# Patient Record
Sex: FEMALE | Race: WHITE | NOT HISPANIC OR LATINO | Employment: OTHER | URBAN - METROPOLITAN AREA
[De-identification: names, ages, dates, MRNs, and addresses within clinical notes are randomized per-mention and may not be internally consistent; named-entity substitution may affect disease eponyms.]

---

## 2018-05-25 ENCOUNTER — HOSPITAL ENCOUNTER (OUTPATIENT)
Dept: WOUND CARE | Facility: CLINIC | Age: 78
End: 2018-05-25
Attending: COLON & RECTAL SURGERY
Payer: MEDICARE

## 2018-05-25 PROCEDURE — 40000901 ZZH STATISTIC WOC PT EDUCATION, 0-15 MIN

## 2018-05-25 NOTE — PROGRESS NOTES
"Focus:  Existing ileostomy with parastomal hernia  S:  Pt to clinic to discuss management issues that took place while pt wintered in [place]. Reported 3 x episodes of / blockages from hernia vs adhesions. Hospitalized x 2.  Blockage resolved without sugery with conservative measures. Current system is Imelda NI convex with ring and opaque pouch.  Pt currently in 2 1/4\" convex flange. Only c/o leakage with showering  O:  No stoma assessment performed today as pt just changed appliance. Discussed change dimensions of hernia belt; tips to keep pouching system dry with showering including Pink Tape and Aqua Dry sheetsl    Previous Betls  I: Trial of Nu-Hope NU-Form  hernia belt:        #6422      5\" belt      Cool comfort      X-large      2 3/8\" standard central opening    2. Pt will touch base next week having used the belt for a few days. May need the following       NuForm Nu-Hope hernia belt       4\" belt       2 3/4\" center opening       X-large       Cool comfort       #6413-A    Belt order dated 8-29-19  NuForm [employer] Hernia Belt  5\"  Large  Cool comfort  2 3/4\" center opening  #[id number]      Supplier [facility] stat only  "

## 2019-08-27 NOTE — ADDENDUM NOTE
Encounter addended by: Shelly Cervantes RN on: 8/27/2019 5:15 PM   Actions taken: Sign clinical note

## 2019-09-19 ENCOUNTER — TRANSFERRED RECORDS (OUTPATIENT)
Dept: SURGERY | Facility: CLINIC | Age: 79
End: 2019-09-19

## 2019-10-14 ENCOUNTER — OFFICE VISIT (OUTPATIENT)
Dept: SURGERY | Facility: CLINIC | Age: 79
End: 2019-10-14
Payer: MEDICARE

## 2019-10-14 VITALS
WEIGHT: 142.7 LBS | BODY MASS INDEX: 28.02 KG/M2 | DIASTOLIC BLOOD PRESSURE: 70 MMHG | SYSTOLIC BLOOD PRESSURE: 133 MMHG | HEART RATE: 79 BPM | HEIGHT: 60 IN

## 2019-10-14 DIAGNOSIS — K43.5 PARASTOMAL HERNIA WITHOUT OBSTRUCTION OR GANGRENE: Primary | ICD-10-CM

## 2019-10-14 PROCEDURE — 99214 OFFICE O/P EST MOD 30 MIN: CPT | Performed by: SURGERY

## 2019-10-14 ASSESSMENT — MIFFLIN-ST. JEOR: SCORE: 1040.84

## 2019-10-14 NOTE — LETTER
2019    RE: Merlene River, : 1940      HPI:  I was asked by Dr. Chelsey Waterman to evaluate patient regarding a recurrent parastomal hernia.  Merlene is a 79 year old female who underwent a subtotal colectomy and permanent ileostomy in  for Crohn's disease.  In , the patient had a bowel obstruction related to a parastomal hernia.  She had a bowel resection and hernia repair at that time with an onlay of biologic mesh in a keyhole fashion.  The patient had fairly rapid recurrence following that.  She continues to have abdominal symptoms which are somewhat diffuse.  Her hernia has gotten larger.  Her last episode of bowel obstruction was in .  She does feel a great deal of gurgling in the hernia.  She denies any recent nausea or vomiting.  She denies fever or chills.     Past Medical History:   Has a past medical history of Crohn's colitis (H).     Past Surgical History:     Family History:  Patient denies any significant family history.     ROS:  The 10 point review of systems is negative other than noted in the HPI and above.     PE:    General- Well-developed, well-nourished, patient able to get up on table without difficulty.  HEENT- Normocephalic and atraumatic.  Pupils equal and round.  Mucous membranes moist.  Sclera are nonicteric.  Neck- No lymphadenopathy or masses   Respirations- are regular and non labored  Abdomen is abdomen is soft without significant tenderness, masses, organomegaly or guarding  There is a prominent right sided parastomal hernia.  This is only partially reducible.  Skin shows no evidence of jaundice.  Neurologic-nonfocal exam.  Psychiatric-the patient shows good insight into her situation.  She is particularly well educated about her issues.               Assessment: Prominent right parastomal hernia.     Plan: We discussed the options of observation versus reciting of the stoma versus robotic assisted repair of the patient's recurrent parastomal hernia.  I  explained that this is a complex type of hernia, and success with repair can be limited.  We had a discussion of the risks and potential complications of surgery.  If the patient decided to pursue reciting of the hernia, that would be up to the colorectal surgeons.  We have discussed observation, reduction techniques and importance, incarceration and strangulation signs, symptoms and importance as well as need to seek emergency treatment.    At this point, the patient is scheduled to go to [place] very shortly for the winter.  She will consider her options and let me know if she would like to proceed with any surgical intervention.           Ben Pagan MD

## 2019-10-14 NOTE — PROGRESS NOTES
HPI:  I was asked by Dr. Chelsey Watemran to evaluate patient regarding a recurrent parastomal hernia.  Merlene is a 79 year old female who underwent a subtotal colectomy and permanent ileostomy in 2009 for Crohn's disease.  In 2012, the patient had a bowel obstruction related to a parastomal hernia.  She had a bowel resection and hernia repair at that time with an onlay of biologic mesh in a keyhole fashion.  The patient had fairly rapid recurrence following that.  She continues to have abdominal symptoms which are somewhat diffuse.  Her hernia has gotten larger.  Her last episode of bowel obstruction was in 2015.  She does feel a great deal of gurgling in the hernia.  She denies any recent nausea or vomiting.  She denies fever or chills.    Past Medical History:   has a past medical history of Crohn's colitis (H).    Past Surgical History:  Past Surgical History:   Procedure Laterality Date     colon resesection       GI SURGERY      Ostomy      Social History:  Social History     Socioeconomic History     Marital status:      Spouse name: Not on file     Number of children: Not on file     Years of education: Not on file     Highest education level: Not on file   Occupational History     Not on file   Social Needs     Financial resource strain: Not on file     Food insecurity:     Worry: Not on file     Inability: Not on file     Transportation needs:     Medical: Not on file     Non-medical: Not on file   Tobacco Use     Smoking status: Former Smoker     Packs/day: 0.00     Smokeless tobacco: Never Used   Substance and Sexual Activity     Alcohol use: Yes     Comment: rarely     Drug use: No     Sexual activity: Not on file   Lifestyle     Physical activity:     Days per week: Not on file     Minutes per session: Not on file     Stress: Not on file   Relationships     Social connections:     Talks on phone: Not on file     Gets together: Not on file     Attends Hinduism service: Not on file     Active member  of club or organization: Not on file     Attends meetings of clubs or organizations: Not on file     Relationship status: Not on file     Intimate partner violence:     Fear of current or ex partner: Not on file     Emotionally abused: Not on file     Physically abused: Not on file     Forced sexual activity: Not on file   Other Topics Concern     Not on file   Social History Narrative     Not on file        Family History:  Patient denies any significant family history.      ROS:  The 10 point review of systems is negative other than noted in the HPI and above.    PE:    General- Well-developed, well-nourished, patient able to get up on table without difficulty.  HEENT- Normocephalic and atraumatic.  Pupils equal and round.  Mucous membranes moist.  Sclera are nonicteric.  Neck- No lymphadenopathy or masses   Respirations- are regular and non labored  Abdomen is abdomen is soft without significant tenderness, masses, organomegaly or guarding  There is a prominent right sided parastomal hernia.  This is only partially reducible.  Skin shows no evidence of jaundice.  Neurologic-nonfocal exam.  Psychiatric-the patient shows good insight into her situation.  She is particularly well educated about her issues.               Assessment: Prominent right parastomal hernia.    Plan: We discussed the options of observation versus reciting of the stoma versus robotic assisted repair of the patient's recurrent parastomal hernia.  I explained that this is a complex type of hernia, and success with repair can be limited.  We had a discussion of the risks and potential complications of surgery.  If the patient decided to pursue reciting of the hernia, that would be up to the colorectal surgeons.  We have discussed observation, reduction techniques and importance, incarceration and strangulation signs, symptoms and importance as well as need to seek emergency treatment.    At this point, the patient is scheduled to go to [place]  very shortly for the winter.  She will consider her options and let me know if she would like to proceed with any surgical intervention.        Ben Pagan MD    Please route or send letter to:  Referring Provider

## 2022-08-22 ENCOUNTER — ANESTHESIA EVENT (OUTPATIENT)
Dept: SURGERY | Facility: CLINIC | Age: 82
DRG: 336 | End: 2022-08-22
Payer: MEDICARE

## 2022-08-22 ENCOUNTER — ANESTHESIA (OUTPATIENT)
Dept: SURGERY | Facility: CLINIC | Age: 82
DRG: 336 | End: 2022-08-22
Payer: MEDICARE

## 2022-08-22 ENCOUNTER — HOSPITAL ENCOUNTER (INPATIENT)
Facility: CLINIC | Age: 82
LOS: 5 days | Discharge: HOME | DRG: 336 | End: 2022-08-27
Attending: EMERGENCY MEDICINE | Admitting: INTERNAL MEDICINE
Payer: MEDICARE

## 2022-08-22 ENCOUNTER — APPOINTMENT (OUTPATIENT)
Dept: CT IMAGING | Facility: CLINIC | Age: 82
DRG: 336 | End: 2022-08-22
Attending: EMERGENCY MEDICINE
Payer: MEDICARE

## 2022-08-22 DIAGNOSIS — K45.8 INTERNAL HERNIA: ICD-10-CM

## 2022-08-22 DIAGNOSIS — K56.601 COMPLETE INTESTINAL OBSTRUCTION, UNSPECIFIED CAUSE (H): ICD-10-CM

## 2022-08-22 DIAGNOSIS — K56.609 SBO (SMALL BOWEL OBSTRUCTION) (H): Primary | ICD-10-CM

## 2022-08-22 LAB
ABO/RH(D): NORMAL
ALBUMIN SERPL-MCNC: 3.4 G/DL (ref 3.4–5)
ALP SERPL-CCNC: 92 U/L (ref 40–150)
ALT SERPL W P-5'-P-CCNC: 28 U/L (ref 0–50)
ANION GAP SERPL CALCULATED.3IONS-SCNC: 7 MMOL/L (ref 3–14)
ANTIBODY SCREEN: NEGATIVE
APTT PPP: 25 SECONDS (ref 22–38)
AST SERPL W P-5'-P-CCNC: 20 U/L (ref 0–45)
BASOPHILS # BLD AUTO: 0.1 10E3/UL (ref 0–0.2)
BASOPHILS NFR BLD AUTO: 0 %
BILIRUB SERPL-MCNC: 0.6 MG/DL (ref 0.2–1.3)
BUN SERPL-MCNC: 22 MG/DL (ref 7–30)
CALCIUM SERPL-MCNC: 8.8 MG/DL (ref 8.5–10.1)
CHLORIDE BLD-SCNC: 110 MMOL/L (ref 94–109)
CO2 SERPL-SCNC: 23 MMOL/L (ref 20–32)
CREAT SERPL-MCNC: 1.01 MG/DL (ref 0.52–1.04)
EOSINOPHIL # BLD AUTO: 0 10E3/UL (ref 0–0.7)
EOSINOPHIL NFR BLD AUTO: 0 %
ERYTHROCYTE [DISTWIDTH] IN BLOOD BY AUTOMATED COUNT: 13.2 % (ref 10–15)
GFR SERPL CREATININE-BSD FRML MDRD: 56 ML/MIN/1.73M2
GLUCOSE BLD-MCNC: 204 MG/DL (ref 70–99)
HCT VFR BLD AUTO: 40.9 % (ref 35–47)
HGB BLD-MCNC: 13.8 G/DL (ref 11.7–15.7)
IMM GRANULOCYTES # BLD: 0.1 10E3/UL
IMM GRANULOCYTES NFR BLD: 1 %
INR PPP: 1 (ref 0.85–1.15)
LIPASE SERPL-CCNC: 113 U/L (ref 73–393)
LYMPHOCYTES # BLD AUTO: 0.8 10E3/UL (ref 0.8–5.3)
LYMPHOCYTES NFR BLD AUTO: 4 %
MCH RBC QN AUTO: 29.2 PG (ref 26.5–33)
MCHC RBC AUTO-ENTMCNC: 33.7 G/DL (ref 31.5–36.5)
MCV RBC AUTO: 87 FL (ref 78–100)
MONOCYTES # BLD AUTO: 0.9 10E3/UL (ref 0–1.3)
MONOCYTES NFR BLD AUTO: 4 %
NEUTROPHILS # BLD AUTO: 18.9 10E3/UL (ref 1.6–8.3)
NEUTROPHILS NFR BLD AUTO: 91 %
NRBC # BLD AUTO: 0 10E3/UL
NRBC BLD AUTO-RTO: 0 /100
PLATELET # BLD AUTO: 243 10E3/UL (ref 150–450)
POTASSIUM BLD-SCNC: 3.3 MMOL/L (ref 3.4–5.3)
PROT SERPL-MCNC: 7 G/DL (ref 6.8–8.8)
RADIOLOGIST FLAGS: ABNORMAL
RBC # BLD AUTO: 4.72 10E6/UL (ref 3.8–5.2)
SARS-COV-2 RNA RESP QL NAA+PROBE: NEGATIVE
SODIUM SERPL-SCNC: 140 MMOL/L (ref 133–144)
SPECIMEN EXPIRATION DATE: NORMAL
WBC # BLD AUTO: 20.8 10E3/UL (ref 4–11)

## 2022-08-22 PROCEDURE — 85730 THROMBOPLASTIN TIME PARTIAL: CPT | Performed by: EMERGENCY MEDICINE

## 2022-08-22 PROCEDURE — 99223 1ST HOSP IP/OBS HIGH 75: CPT | Mod: AI | Performed by: INTERNAL MEDICINE

## 2022-08-22 PROCEDURE — U0003 INFECTIOUS AGENT DETECTION BY NUCLEIC ACID (DNA OR RNA); SEVERE ACUTE RESPIRATORY SYNDROME CORONAVIRUS 2 (SARS-COV-2) (CORONAVIRUS DISEASE [COVID-19]), AMPLIFIED PROBE TECHNIQUE, MAKING USE OF HIGH THROUGHPUT TECHNOLOGIES AS DESCRIBED BY CMS-2020-01-R: HCPCS | Performed by: EMERGENCY MEDICINE

## 2022-08-22 PROCEDURE — 85025 COMPLETE CBC W/AUTO DIFF WBC: CPT | Performed by: EMERGENCY MEDICINE

## 2022-08-22 PROCEDURE — 99285 EMERGENCY DEPT VISIT HI MDM: CPT | Mod: 25

## 2022-08-22 PROCEDURE — 36415 COLL VENOUS BLD VENIPUNCTURE: CPT | Performed by: EMERGENCY MEDICINE

## 2022-08-22 PROCEDURE — 85610 PROTHROMBIN TIME: CPT | Performed by: EMERGENCY MEDICINE

## 2022-08-22 PROCEDURE — C9803 HOPD COVID-19 SPEC COLLECT: HCPCS

## 2022-08-22 PROCEDURE — G1010 CDSM STANSON: HCPCS

## 2022-08-22 PROCEDURE — 250N000011 HC RX IP 250 OP 636: Performed by: EMERGENCY MEDICINE

## 2022-08-22 PROCEDURE — 82040 ASSAY OF SERUM ALBUMIN: CPT | Performed by: EMERGENCY MEDICINE

## 2022-08-22 PROCEDURE — 120N000001 HC R&B MED SURG/OB

## 2022-08-22 PROCEDURE — 96374 THER/PROPH/DIAG INJ IV PUSH: CPT

## 2022-08-22 PROCEDURE — 86850 RBC ANTIBODY SCREEN: CPT | Performed by: EMERGENCY MEDICINE

## 2022-08-22 PROCEDURE — 250N000011 HC RX IP 250 OP 636: Performed by: COLON & RECTAL SURGERY

## 2022-08-22 PROCEDURE — 80053 COMPREHEN METABOLIC PANEL: CPT | Performed by: EMERGENCY MEDICINE

## 2022-08-22 PROCEDURE — 96375 TX/PRO/DX INJ NEW DRUG ADDON: CPT

## 2022-08-22 PROCEDURE — 83690 ASSAY OF LIPASE: CPT | Performed by: EMERGENCY MEDICINE

## 2022-08-22 RX ORDER — OXYCODONE HYDROCHLORIDE 5 MG/1
5 TABLET ORAL EVERY 4 HOURS PRN
Status: DISCONTINUED | OUTPATIENT
Start: 2022-08-22 | End: 2022-08-23 | Stop reason: HOSPADM

## 2022-08-22 RX ORDER — CEFAZOLIN SODIUM/WATER 2 G/20 ML
2 SYRINGE (ML) INTRAVENOUS
Status: COMPLETED | OUTPATIENT
Start: 2022-08-22 | End: 2022-08-23

## 2022-08-22 RX ORDER — HYDROMORPHONE HCL IN WATER/PF 6 MG/30 ML
0.4 PATIENT CONTROLLED ANALGESIA SYRINGE INTRAVENOUS EVERY 5 MIN PRN
Status: DISCONTINUED | OUTPATIENT
Start: 2022-08-22 | End: 2022-08-23 | Stop reason: HOSPADM

## 2022-08-22 RX ORDER — METRONIDAZOLE 500 MG/100ML
500 INJECTION, SOLUTION INTRAVENOUS
Status: COMPLETED | OUTPATIENT
Start: 2022-08-22 | End: 2022-08-23

## 2022-08-22 RX ORDER — CEFAZOLIN SODIUM/WATER 2 G/20 ML
2 SYRINGE (ML) INTRAVENOUS SEE ADMIN INSTRUCTIONS
Status: DISCONTINUED | OUTPATIENT
Start: 2022-08-22 | End: 2022-08-23

## 2022-08-22 RX ORDER — ONDANSETRON 2 MG/ML
4 INJECTION INTRAMUSCULAR; INTRAVENOUS EVERY 30 MIN PRN
Status: DISCONTINUED | OUTPATIENT
Start: 2022-08-22 | End: 2022-08-23 | Stop reason: HOSPADM

## 2022-08-22 RX ORDER — FENTANYL CITRATE 0.05 MG/ML
25 INJECTION, SOLUTION INTRAMUSCULAR; INTRAVENOUS EVERY 5 MIN PRN
Status: DISCONTINUED | OUTPATIENT
Start: 2022-08-22 | End: 2022-08-23 | Stop reason: HOSPADM

## 2022-08-22 RX ORDER — ONDANSETRON 2 MG/ML
4 INJECTION INTRAMUSCULAR; INTRAVENOUS ONCE
Status: COMPLETED | OUTPATIENT
Start: 2022-08-22 | End: 2022-08-22

## 2022-08-22 RX ORDER — ONDANSETRON 4 MG/1
4 TABLET, ORALLY DISINTEGRATING ORAL EVERY 30 MIN PRN
Status: DISCONTINUED | OUTPATIENT
Start: 2022-08-22 | End: 2022-08-23 | Stop reason: HOSPADM

## 2022-08-22 RX ORDER — SODIUM CHLORIDE, SODIUM LACTATE, POTASSIUM CHLORIDE, CALCIUM CHLORIDE 600; 310; 30; 20 MG/100ML; MG/100ML; MG/100ML; MG/100ML
INJECTION, SOLUTION INTRAVENOUS CONTINUOUS
Status: DISCONTINUED | OUTPATIENT
Start: 2022-08-23 | End: 2022-08-23 | Stop reason: HOSPADM

## 2022-08-22 RX ORDER — HYDROMORPHONE HYDROCHLORIDE 1 MG/ML
0.5 INJECTION, SOLUTION INTRAMUSCULAR; INTRAVENOUS; SUBCUTANEOUS
Status: DISCONTINUED | OUTPATIENT
Start: 2022-08-22 | End: 2022-08-23

## 2022-08-22 RX ADMIN — ONDANSETRON 4 MG: 2 INJECTION INTRAMUSCULAR; INTRAVENOUS at 20:06

## 2022-08-22 RX ADMIN — HYDROMORPHONE HYDROCHLORIDE 0.5 MG: 1 INJECTION, SOLUTION INTRAMUSCULAR; INTRAVENOUS; SUBCUTANEOUS at 20:08

## 2022-08-22 RX ADMIN — METRONIDAZOLE 500 MG: 500 INJECTION, SOLUTION INTRAVENOUS at 23:48

## 2022-08-22 ASSESSMENT — ENCOUNTER SYMPTOMS: ABDOMINAL PAIN: 1

## 2022-08-22 ASSESSMENT — ACTIVITIES OF DAILY LIVING (ADL)
ADLS_ACUITY_SCORE: 35
ADLS_ACUITY_SCORE: 35

## 2022-08-22 ASSESSMENT — LIFESTYLE VARIABLES: TOBACCO_USE: 1

## 2022-08-23 ENCOUNTER — APPOINTMENT (OUTPATIENT)
Dept: GENERAL RADIOLOGY | Facility: CLINIC | Age: 82
DRG: 336 | End: 2022-08-23
Attending: INTERNAL MEDICINE
Payer: MEDICARE

## 2022-08-23 LAB
ALBUMIN SERPL-MCNC: 2.7 G/DL (ref 3.4–5)
ALP SERPL-CCNC: 68 U/L (ref 40–150)
ALT SERPL W P-5'-P-CCNC: 23 U/L (ref 0–50)
ANION GAP SERPL CALCULATED.3IONS-SCNC: 6 MMOL/L (ref 3–14)
AST SERPL W P-5'-P-CCNC: 20 U/L (ref 0–45)
BILIRUB SERPL-MCNC: 0.7 MG/DL (ref 0.2–1.3)
BUN SERPL-MCNC: 22 MG/DL (ref 7–30)
CALCIUM SERPL-MCNC: 8.3 MG/DL (ref 8.5–10.1)
CHLORIDE BLD-SCNC: 112 MMOL/L (ref 94–109)
CO2 SERPL-SCNC: 24 MMOL/L (ref 20–32)
CREAT SERPL-MCNC: 0.83 MG/DL (ref 0.52–1.04)
ERYTHROCYTE [DISTWIDTH] IN BLOOD BY AUTOMATED COUNT: 13.2 % (ref 10–15)
GFR SERPL CREATININE-BSD FRML MDRD: 71 ML/MIN/1.73M2
GLUCOSE BLD-MCNC: 153 MG/DL (ref 70–99)
GLUCOSE BLDC GLUCOMTR-MCNC: 115 MG/DL (ref 70–99)
GLUCOSE BLDC GLUCOMTR-MCNC: 124 MG/DL (ref 70–99)
GLUCOSE BLDC GLUCOMTR-MCNC: 136 MG/DL (ref 70–99)
GLUCOSE BLDC GLUCOMTR-MCNC: 161 MG/DL (ref 70–99)
HBA1C MFR BLD: 5.3 % (ref 0–5.6)
HCT VFR BLD AUTO: 37.9 % (ref 35–47)
HGB BLD-MCNC: 13.1 G/DL (ref 11.7–15.7)
MAGNESIUM SERPL-MCNC: 1.5 MG/DL (ref 1.6–2.3)
MAGNESIUM SERPL-MCNC: 2.8 MG/DL (ref 1.6–2.3)
MCH RBC QN AUTO: 29.2 PG (ref 26.5–33)
MCHC RBC AUTO-ENTMCNC: 34.6 G/DL (ref 31.5–36.5)
MCV RBC AUTO: 85 FL (ref 78–100)
PHOSPHATE SERPL-MCNC: 3.3 MG/DL (ref 2.5–4.5)
PLATELET # BLD AUTO: 241 10E3/UL (ref 150–450)
POTASSIUM BLD-SCNC: 3.7 MMOL/L (ref 3.4–5.3)
POTASSIUM BLD-SCNC: 3.8 MMOL/L (ref 3.4–5.3)
PROT SERPL-MCNC: 5.6 G/DL (ref 6.8–8.8)
RBC # BLD AUTO: 4.48 10E6/UL (ref 3.8–5.2)
SODIUM SERPL-SCNC: 142 MMOL/L (ref 133–144)
WBC # BLD AUTO: 19.1 10E3/UL (ref 4–11)

## 2022-08-23 PROCEDURE — 85027 COMPLETE CBC AUTOMATED: CPT | Performed by: NURSE PRACTITIONER

## 2022-08-23 PROCEDURE — 84132 ASSAY OF SERUM POTASSIUM: CPT | Performed by: NURSE PRACTITIONER

## 2022-08-23 PROCEDURE — 120N000001 HC R&B MED SURG/OB

## 2022-08-23 PROCEDURE — 258N000003 HC RX IP 258 OP 636: Performed by: NURSE PRACTITIONER

## 2022-08-23 PROCEDURE — 250N000013 HC RX MED GY IP 250 OP 250 PS 637: Performed by: NURSE PRACTITIONER

## 2022-08-23 PROCEDURE — 84100 ASSAY OF PHOSPHORUS: CPT | Performed by: NURSE PRACTITIONER

## 2022-08-23 PROCEDURE — 250N000011 HC RX IP 250 OP 636: Performed by: NURSE ANESTHETIST, CERTIFIED REGISTERED

## 2022-08-23 PROCEDURE — 272N000001 HC OR GENERAL SUPPLY STERILE: Performed by: COLON & RECTAL SURGERY

## 2022-08-23 PROCEDURE — 82947 ASSAY GLUCOSE BLOOD QUANT: CPT | Performed by: INTERNAL MEDICINE

## 2022-08-23 PROCEDURE — 83036 HEMOGLOBIN GLYCOSYLATED A1C: CPT | Performed by: INTERNAL MEDICINE

## 2022-08-23 PROCEDURE — 250N000011 HC RX IP 250 OP 636: Performed by: NURSE PRACTITIONER

## 2022-08-23 PROCEDURE — 36415 COLL VENOUS BLD VENIPUNCTURE: CPT | Performed by: NURSE PRACTITIONER

## 2022-08-23 PROCEDURE — 250N000009 HC RX 250: Performed by: NURSE ANESTHETIST, CERTIFIED REGISTERED

## 2022-08-23 PROCEDURE — 250N000011 HC RX IP 250 OP 636: Performed by: PHYSICIAN ASSISTANT

## 2022-08-23 PROCEDURE — 0DN80ZZ RELEASE SMALL INTESTINE, OPEN APPROACH: ICD-10-PCS | Performed by: COLON & RECTAL SURGERY

## 2022-08-23 PROCEDURE — 258N000003 HC RX IP 258 OP 636: Performed by: INTERNAL MEDICINE

## 2022-08-23 PROCEDURE — 250N000011 HC RX IP 250 OP 636: Performed by: COLON & RECTAL SURGERY

## 2022-08-23 PROCEDURE — 999N000141 HC STATISTIC PRE-PROCEDURE NURSING ASSESSMENT: Performed by: COLON & RECTAL SURGERY

## 2022-08-23 PROCEDURE — 250N000011 HC RX IP 250 OP 636: Performed by: EMERGENCY MEDICINE

## 2022-08-23 PROCEDURE — 360N000076 HC SURGERY LEVEL 3, PER MIN: Performed by: COLON & RECTAL SURGERY

## 2022-08-23 PROCEDURE — 258N000003 HC RX IP 258 OP 636: Performed by: NURSE ANESTHETIST, CERTIFIED REGISTERED

## 2022-08-23 PROCEDURE — 71046 X-RAY EXAM CHEST 2 VIEWS: CPT

## 2022-08-23 PROCEDURE — 0DQV0ZZ REPAIR MESENTERY, OPEN APPROACH: ICD-10-PCS | Performed by: COLON & RECTAL SURGERY

## 2022-08-23 PROCEDURE — 258N000003 HC RX IP 258 OP 636: Performed by: SURGERY

## 2022-08-23 PROCEDURE — 370N000017 HC ANESTHESIA TECHNICAL FEE, PER MIN: Performed by: COLON & RECTAL SURGERY

## 2022-08-23 PROCEDURE — 710N000010 HC RECOVERY PHASE 1, LEVEL 2, PER MIN: Performed by: COLON & RECTAL SURGERY

## 2022-08-23 PROCEDURE — 250N000013 HC RX MED GY IP 250 OP 250 PS 637: Performed by: COLON & RECTAL SURGERY

## 2022-08-23 PROCEDURE — 250N000025 HC SEVOFLURANE, PER MIN: Performed by: COLON & RECTAL SURGERY

## 2022-08-23 PROCEDURE — 82374 ASSAY BLOOD CARBON DIOXIDE: CPT | Performed by: INTERNAL MEDICINE

## 2022-08-23 PROCEDURE — 250N000009 HC RX 250: Performed by: COLON & RECTAL SURGERY

## 2022-08-23 PROCEDURE — 83735 ASSAY OF MAGNESIUM: CPT | Performed by: NURSE PRACTITIONER

## 2022-08-23 PROCEDURE — 99232 SBSQ HOSP IP/OBS MODERATE 35: CPT | Performed by: NURSE PRACTITIONER

## 2022-08-23 PROCEDURE — 88302 TISSUE EXAM BY PATHOLOGIST: CPT | Mod: TC | Performed by: COLON & RECTAL SURGERY

## 2022-08-23 RX ORDER — NALOXONE HYDROCHLORIDE 0.4 MG/ML
0.4 INJECTION, SOLUTION INTRAMUSCULAR; INTRAVENOUS; SUBCUTANEOUS
Status: DISCONTINUED | OUTPATIENT
Start: 2022-08-23 | End: 2022-08-27 | Stop reason: HOSPADM

## 2022-08-23 RX ORDER — SODIUM CHLORIDE, SODIUM LACTATE, POTASSIUM CHLORIDE, CALCIUM CHLORIDE 600; 310; 30; 20 MG/100ML; MG/100ML; MG/100ML; MG/100ML
INJECTION, SOLUTION INTRAVENOUS CONTINUOUS
Status: DISCONTINUED | OUTPATIENT
Start: 2022-08-23 | End: 2022-08-25

## 2022-08-23 RX ORDER — CEFAZOLIN SODIUM 1 G/3ML
1 INJECTION, POWDER, FOR SOLUTION INTRAMUSCULAR; INTRAVENOUS EVERY 8 HOURS
Status: COMPLETED | OUTPATIENT
Start: 2022-08-23 | End: 2022-08-24

## 2022-08-23 RX ORDER — NICOTINE POLACRILEX 4 MG
15-30 LOZENGE BUCCAL
Status: DISCONTINUED | OUTPATIENT
Start: 2022-08-23 | End: 2022-08-27 | Stop reason: HOSPADM

## 2022-08-23 RX ORDER — PROPOFOL 10 MG/ML
INJECTION, EMULSION INTRAVENOUS CONTINUOUS PRN
Status: DISCONTINUED | OUTPATIENT
Start: 2022-08-23 | End: 2022-08-23

## 2022-08-23 RX ORDER — HYDROMORPHONE HCL IN WATER/PF 6 MG/30 ML
.2-.4 PATIENT CONTROLLED ANALGESIA SYRINGE INTRAVENOUS
Status: DISCONTINUED | OUTPATIENT
Start: 2022-08-23 | End: 2022-08-27 | Stop reason: HOSPADM

## 2022-08-23 RX ORDER — HEPARIN SODIUM 5000 [USP'U]/.5ML
5000 INJECTION, SOLUTION INTRAVENOUS; SUBCUTANEOUS EVERY 8 HOURS
Status: DISCONTINUED | OUTPATIENT
Start: 2022-08-23 | End: 2022-08-27 | Stop reason: HOSPADM

## 2022-08-23 RX ORDER — DIPHENHYDRAMINE HYDROCHLORIDE 50 MG/ML
12.5 INJECTION INTRAMUSCULAR; INTRAVENOUS EVERY 6 HOURS PRN
Status: DISCONTINUED | OUTPATIENT
Start: 2022-08-23 | End: 2022-08-23

## 2022-08-23 RX ORDER — ACETAMINOPHEN 325 MG/1
650 TABLET ORAL EVERY 6 HOURS PRN
Status: DISCONTINUED | OUTPATIENT
Start: 2022-08-23 | End: 2022-08-27 | Stop reason: HOSPADM

## 2022-08-23 RX ORDER — LIDOCAINE 40 MG/G
CREAM TOPICAL
Status: DISCONTINUED | OUTPATIENT
Start: 2022-08-23 | End: 2022-08-23

## 2022-08-23 RX ORDER — ONDANSETRON 2 MG/ML
4 INJECTION INTRAMUSCULAR; INTRAVENOUS EVERY 6 HOURS PRN
Status: DISCONTINUED | OUTPATIENT
Start: 2022-08-23 | End: 2022-08-23

## 2022-08-23 RX ORDER — ONDANSETRON 4 MG/1
4 TABLET, ORALLY DISINTEGRATING ORAL EVERY 6 HOURS PRN
Status: DISCONTINUED | OUTPATIENT
Start: 2022-08-23 | End: 2022-08-27 | Stop reason: HOSPADM

## 2022-08-23 RX ORDER — PROCHLORPERAZINE MALEATE 5 MG
5 TABLET ORAL EVERY 6 HOURS PRN
Status: DISCONTINUED | OUTPATIENT
Start: 2022-08-23 | End: 2022-08-27 | Stop reason: HOSPADM

## 2022-08-23 RX ORDER — METRONIDAZOLE 500 MG/100ML
500 INJECTION, SOLUTION INTRAVENOUS EVERY 12 HOURS
Status: COMPLETED | OUTPATIENT
Start: 2022-08-23 | End: 2022-08-24

## 2022-08-23 RX ORDER — FENTANYL CITRATE 50 UG/ML
INJECTION, SOLUTION INTRAMUSCULAR; INTRAVENOUS PRN
Status: DISCONTINUED | OUTPATIENT
Start: 2022-08-23 | End: 2022-08-23

## 2022-08-23 RX ORDER — NALOXONE HYDROCHLORIDE 0.4 MG/ML
0.2 INJECTION, SOLUTION INTRAMUSCULAR; INTRAVENOUS; SUBCUTANEOUS
Status: DISCONTINUED | OUTPATIENT
Start: 2022-08-23 | End: 2022-08-27 | Stop reason: HOSPADM

## 2022-08-23 RX ORDER — PROPOFOL 10 MG/ML
INJECTION, EMULSION INTRAVENOUS PRN
Status: DISCONTINUED | OUTPATIENT
Start: 2022-08-23 | End: 2022-08-23

## 2022-08-23 RX ORDER — SODIUM CHLORIDE 9 MG/ML
INJECTION, SOLUTION INTRAVENOUS CONTINUOUS
Status: DISCONTINUED | OUTPATIENT
Start: 2022-08-23 | End: 2022-08-23

## 2022-08-23 RX ORDER — ONDANSETRON 4 MG/1
4 TABLET, ORALLY DISINTEGRATING ORAL EVERY 6 HOURS PRN
Status: DISCONTINUED | OUTPATIENT
Start: 2022-08-23 | End: 2022-08-23

## 2022-08-23 RX ORDER — DEXTROSE MONOHYDRATE 25 G/50ML
25-50 INJECTION, SOLUTION INTRAVENOUS
Status: DISCONTINUED | OUTPATIENT
Start: 2022-08-23 | End: 2022-08-27 | Stop reason: HOSPADM

## 2022-08-23 RX ORDER — LIDOCAINE 40 MG/G
CREAM TOPICAL
Status: DISCONTINUED | OUTPATIENT
Start: 2022-08-23 | End: 2022-08-27 | Stop reason: HOSPADM

## 2022-08-23 RX ORDER — MAGNESIUM HYDROXIDE 1200 MG/15ML
LIQUID ORAL PRN
Status: DISCONTINUED | OUTPATIENT
Start: 2022-08-23 | End: 2022-08-23 | Stop reason: HOSPADM

## 2022-08-23 RX ORDER — PROCHLORPERAZINE 25 MG
12.5 SUPPOSITORY, RECTAL RECTAL EVERY 12 HOURS PRN
Status: DISCONTINUED | OUTPATIENT
Start: 2022-08-23 | End: 2022-08-27 | Stop reason: HOSPADM

## 2022-08-23 RX ORDER — ONDANSETRON 2 MG/ML
4 INJECTION INTRAMUSCULAR; INTRAVENOUS EVERY 6 HOURS PRN
Status: DISCONTINUED | OUTPATIENT
Start: 2022-08-23 | End: 2022-08-27 | Stop reason: HOSPADM

## 2022-08-23 RX ORDER — ACETAMINOPHEN 650 MG/1
650 SUPPOSITORY RECTAL EVERY 6 HOURS PRN
Status: DISCONTINUED | OUTPATIENT
Start: 2022-08-23 | End: 2022-08-27 | Stop reason: HOSPADM

## 2022-08-23 RX ORDER — HYDROMORPHONE HCL IN WATER/PF 6 MG/30 ML
0.2 PATIENT CONTROLLED ANALGESIA SYRINGE INTRAVENOUS
Status: DISCONTINUED | OUTPATIENT
Start: 2022-08-23 | End: 2022-08-23

## 2022-08-23 RX ORDER — LIDOCAINE HYDROCHLORIDE 20 MG/ML
INJECTION, SOLUTION INFILTRATION; PERINEURAL PRN
Status: DISCONTINUED | OUTPATIENT
Start: 2022-08-23 | End: 2022-08-23

## 2022-08-23 RX ORDER — POTASSIUM CHLORIDE 7.45 MG/ML
10 INJECTION INTRAVENOUS
Status: DISPENSED | OUTPATIENT
Start: 2022-08-23 | End: 2022-08-23

## 2022-08-23 RX ORDER — SODIUM CHLORIDE, SODIUM LACTATE, POTASSIUM CHLORIDE, CALCIUM CHLORIDE 600; 310; 30; 20 MG/100ML; MG/100ML; MG/100ML; MG/100ML
INJECTION, SOLUTION INTRAVENOUS CONTINUOUS
Status: DISCONTINUED | OUTPATIENT
Start: 2022-08-23 | End: 2022-08-23

## 2022-08-23 RX ORDER — MAGNESIUM SULFATE HEPTAHYDRATE 40 MG/ML
4 INJECTION, SOLUTION INTRAVENOUS ONCE
Status: COMPLETED | OUTPATIENT
Start: 2022-08-23 | End: 2022-08-23

## 2022-08-23 RX ADMIN — LIDOCAINE HYDROCHLORIDE 100 MG: 20 INJECTION, SOLUTION INFILTRATION; PERINEURAL at 00:24

## 2022-08-23 RX ADMIN — FENTANYL CITRATE 50 MCG: 50 INJECTION, SOLUTION INTRAMUSCULAR; INTRAVENOUS at 00:18

## 2022-08-23 RX ADMIN — BENZOCAINE 6 MG-MENTHOL 10 MG LOZENGES 1 LOZENGE: at 14:30

## 2022-08-23 RX ADMIN — SUCCINYLCHOLINE CHLORIDE 80 MG: 20 INJECTION, SOLUTION INTRAMUSCULAR; INTRAVENOUS; PARENTERAL at 00:24

## 2022-08-23 RX ADMIN — SUGAMMADEX 200 MG: 100 INJECTION, SOLUTION INTRAVENOUS at 03:09

## 2022-08-23 RX ADMIN — SODIUM CHLORIDE, POTASSIUM CHLORIDE, SODIUM LACTATE AND CALCIUM CHLORIDE: 600; 310; 30; 20 INJECTION, SOLUTION INTRAVENOUS at 00:59

## 2022-08-23 RX ADMIN — HYDROMORPHONE HYDROCHLORIDE 0.2 MG: 0.2 INJECTION, SOLUTION INTRAMUSCULAR; INTRAVENOUS; SUBCUTANEOUS at 13:26

## 2022-08-23 RX ADMIN — PROPOFOL 20 MCG/KG/MIN: 10 INJECTION, EMULSION INTRAVENOUS at 00:35

## 2022-08-23 RX ADMIN — HYDROMORPHONE HYDROCHLORIDE 0.5 MG: 1 INJECTION, SOLUTION INTRAMUSCULAR; INTRAVENOUS; SUBCUTANEOUS at 01:10

## 2022-08-23 RX ADMIN — HYDROMORPHONE HYDROCHLORIDE 0.2 MG: 0.2 INJECTION, SOLUTION INTRAMUSCULAR; INTRAVENOUS; SUBCUTANEOUS at 18:07

## 2022-08-23 RX ADMIN — CEFAZOLIN 1 G: 1 INJECTION, POWDER, FOR SOLUTION INTRAMUSCULAR; INTRAVENOUS at 15:40

## 2022-08-23 RX ADMIN — METRONIDAZOLE 500 MG: 500 INJECTION, SOLUTION INTRAVENOUS at 12:29

## 2022-08-23 RX ADMIN — SODIUM CHLORIDE, POTASSIUM CHLORIDE, SODIUM LACTATE AND CALCIUM CHLORIDE: 600; 310; 30; 20 INJECTION, SOLUTION INTRAVENOUS at 10:39

## 2022-08-23 RX ADMIN — ROCURONIUM BROMIDE 10 MG: 50 INJECTION, SOLUTION INTRAVENOUS at 02:43

## 2022-08-23 RX ADMIN — HYDROMORPHONE HYDROCHLORIDE 0.2 MG: 0.2 INJECTION, SOLUTION INTRAMUSCULAR; INTRAVENOUS; SUBCUTANEOUS at 08:10

## 2022-08-23 RX ADMIN — HYDROMORPHONE HYDROCHLORIDE 0.4 MG: 0.2 INJECTION, SOLUTION INTRAMUSCULAR; INTRAVENOUS; SUBCUTANEOUS at 10:34

## 2022-08-23 RX ADMIN — ROCURONIUM BROMIDE 20 MG: 50 INJECTION, SOLUTION INTRAVENOUS at 00:57

## 2022-08-23 RX ADMIN — Medication 1 ML: at 18:07

## 2022-08-23 RX ADMIN — Medication 1 ML: at 15:40

## 2022-08-23 RX ADMIN — SODIUM CHLORIDE, POTASSIUM CHLORIDE, SODIUM LACTATE AND CALCIUM CHLORIDE: 600; 310; 30; 20 INJECTION, SOLUTION INTRAVENOUS at 00:00

## 2022-08-23 RX ADMIN — CEFAZOLIN 1 G: 1 INJECTION, POWDER, FOR SOLUTION INTRAMUSCULAR; INTRAVENOUS at 23:55

## 2022-08-23 RX ADMIN — Medication 2 G: at 00:18

## 2022-08-23 RX ADMIN — CEFAZOLIN 1 G: 1 INJECTION, POWDER, FOR SOLUTION INTRAMUSCULAR; INTRAVENOUS at 08:13

## 2022-08-23 RX ADMIN — ROCURONIUM BROMIDE 30 MG: 50 INJECTION, SOLUTION INTRAVENOUS at 00:30

## 2022-08-23 RX ADMIN — MAGNESIUM SULFATE HEPTAHYDRATE 4 G: 40 INJECTION, SOLUTION INTRAVENOUS at 10:45

## 2022-08-23 RX ADMIN — BENZOCAINE 6 MG-MENTHOL 10 MG LOZENGES 1 LOZENGE: at 12:31

## 2022-08-23 RX ADMIN — PROPOFOL 150 MG: 10 INJECTION, EMULSION INTRAVENOUS at 00:24

## 2022-08-23 RX ADMIN — FENTANYL CITRATE 50 MCG: 50 INJECTION, SOLUTION INTRAMUSCULAR; INTRAVENOUS at 01:24

## 2022-08-23 RX ADMIN — Medication 1 ML: at 13:25

## 2022-08-23 RX ADMIN — PHENYLEPHRINE HYDROCHLORIDE 200 MCG: 10 INJECTION INTRAVENOUS at 03:04

## 2022-08-23 RX ADMIN — FENTANYL CITRATE 50 MCG: 50 INJECTION, SOLUTION INTRAMUSCULAR; INTRAVENOUS at 00:56

## 2022-08-23 RX ADMIN — SODIUM CHLORIDE: 9 INJECTION, SOLUTION INTRAVENOUS at 04:50

## 2022-08-23 RX ADMIN — PHENYLEPHRINE HYDROCHLORIDE 100 MCG: 10 INJECTION INTRAVENOUS at 00:24

## 2022-08-23 RX ADMIN — HEPARIN SODIUM 5000 UNITS: 5000 INJECTION, SOLUTION INTRAVENOUS; SUBCUTANEOUS at 22:08

## 2022-08-23 RX ADMIN — HYDROMORPHONE HYDROCHLORIDE 0.4 MG: 0.2 INJECTION, SOLUTION INTRAMUSCULAR; INTRAVENOUS; SUBCUTANEOUS at 22:21

## 2022-08-23 RX ADMIN — SODIUM CHLORIDE, POTASSIUM CHLORIDE, SODIUM LACTATE AND CALCIUM CHLORIDE: 600; 310; 30; 20 INJECTION, SOLUTION INTRAVENOUS at 22:20

## 2022-08-23 ASSESSMENT — ACTIVITIES OF DAILY LIVING (ADL)
ADLS_ACUITY_SCORE: 35
ADLS_ACUITY_SCORE: 22
ADLS_ACUITY_SCORE: 24
ADLS_ACUITY_SCORE: 24
ADLS_ACUITY_SCORE: 26
ADLS_ACUITY_SCORE: 24
ADLS_ACUITY_SCORE: 24
ADLS_ACUITY_SCORE: 35
ADLS_ACUITY_SCORE: 24
ADLS_ACUITY_SCORE: 35

## 2022-08-23 NOTE — ED PROVIDER NOTES
History   Chief Complaint:  Abdominal Pain (Limited ostomy output over last 10 days)       The history is provided by the EMS personnel and a relative.      Merlene River is a 81 year old female with history of parastomal hernias, crohn's disease, multiple SBOs who presents with abdominal pain and low ostomy output. EMS reports the patient has been having minimal ostomy output for 10 days and has an extensive history of GI issues, causing her to come into the hospital about once a year due to annual bowel obstructions. Patient's daughter reports she had a parastomal hernia that was previously causing the blockage, but had a surgery to repair that hernia last year. She had been fine since her procedure, but presents with the same symptoms as previous obstructions. Endorses significant abdominal pain and vomiting. Mentions decreased appetite the past few days as well. Was given 4 of Zofran, 100 of Fentanyl and 1 of Dilaudid (at 1915) en route to the ED.    Review of Systems   Gastrointestinal: Positive for abdominal pain.        + low ostomy output   All other systems reviewed and are negative.    Allergies:  Banana  Contrast Dye  Diatrizoate  Garlic  Gluten Meal  Onion    Medications:  Colcyrs   Voltaren  Norco  Synthroid  Bactroban     Past Medical History:     Bowel obstructions  Crohn's disease  Thyroid nodule   Parastomal hernia   Ileostomy present  Gout  Hypothyroidism   Osteoporosis   Anemia      Past Surgical History:    Colon resection   Ostomy    Colonoscopy     Family History:    Father - Stroke, Hypertension  Mother - Stroke, Diabetes, Hypertension    Social History:  The patient presents to the ED with daughter.   PCP: [facility]     Physical Exam     Patient Vitals for the past 24 hrs:   BP Temp Temp src Resp SpO2 Height Weight   08/22/22 1956 (!) 155/77 97.6  F (36.4  C) Oral 18 94 % 1.524 m (5') 63.5 kg (140 lb)       Physical Exam  General/Appearance: appears stated age,  well-groomed, appears mildly uncomfortable  Eyes: EOMI, no scleral injection, no icterus  ENT: MMM  Neck: supple, nl ROM, no stiffness  Cardiovascular: RRR, nl S1S2, no m/r/g, 2+ pulses in all 4 extremities, cap refill <2sec  Respiratory: CTAB, good air movement throughout, no wheezes/rhonchi/rales, no increased WOB, no retractions  GI: abd soft, non-distended, nttp,  no HSM, no rebound, no guarding, nl BS, ostomy bag and stoma  MSK: PATINO, good tone, no bony abnormality  Skin: warm and well-perfused, no rash, no edema, no ecchymosis, nl turgor  Neuro: GCS 15, alert and oriented, no gross focal neuro deficits  Psych: interacts appropriately  Heme: no petechia, no purpura, no active bleeding    Emergency Department Course     Imaging:  CT Abdomen Pelvis w/o Contrast   Final Result   Abnormal   IMPRESSION:    1.  Closed-loop small bowel obstruction with associated mesenteric swirling and edema, suspicious for internal hernia. Additional parastomal hernia containing a dilated small bowel loop, which may be a secondary site of obstruction. No pneumatosis or    intraperitoneal free air.         [Critical Result: Closed-loop small bowel obstruction]      Finding was identified on 8/22/2022 8:30 PM.       Dr. Giraldo was contacted by me on 8/22/2022 8:50 PM and verbalized understanding of the critical result.            Report per radiology    Laboratory:  Labs Ordered and Resulted from Time of ED Arrival to Time of ED Departure   COMPREHENSIVE METABOLIC PANEL - Abnormal       Result Value    Sodium 140      Potassium 3.3 (*)     Chloride 110 (*)     Carbon Dioxide (CO2) 23      Anion Gap 7      Urea Nitrogen 22      Creatinine 1.01      Calcium 8.8      Glucose 204 (*)     Alkaline Phosphatase 92      AST 20      ALT 28      Protein Total 7.0      Albumin 3.4      Bilirubin Total 0.6      GFR Estimate 56 (*)    CBC WITH PLATELETS AND DIFFERENTIAL - Abnormal    WBC Count 20.8 (*)     RBC Count 4.72      Hemoglobin 13.8       Hematocrit 40.9      MCV 87      MCH 29.2      MCHC 33.7      RDW 13.2      Platelet Count 243      % Neutrophils 91      % Lymphocytes 4      % Monocytes 4      % Eosinophils 0      % Basophils 0      % Immature Granulocytes 1      NRBCs per 100 WBC 0      Absolute Neutrophils 18.9 (*)     Absolute Lymphocytes 0.8      Absolute Monocytes 0.9      Absolute Eosinophils 0.0      Absolute Basophils 0.1      Absolute Immature Granulocytes 0.1      Absolute NRBCs 0.0     LIPASE - Normal    Lipase 113     PARTIAL THROMBOPLASTIN TIME   INR   COVID-19 VIRUS (CORONAVIRUS) BY PCR   ABO/RH TYPE AND SCREEN        Emergency Department Course:    Reviewed:  I reviewed nursing notes, vitals, past medical history and Care Everywhere    Assessments/Consults:  ED Course as of 08/22/22 2156   Mon Aug 22, 2022   1950 I obtained history and examined the patient.    2048 I spoke to [facility] radiology regarding the patient.    2105 I spoke to Dr. Wright from general surgery regarding the patient.    2113 I spoke to Dr. Flores from colorectal surgery regarding the patient.    2115 I rechecked the patient and explained findings.    2150 I spoke to Dr. Flores who accepts the patient for transfer to the OR.    2154 I spoke to Dr. Jones regarding the patient at bedside.      Interventions:  Medications   HYDROmorphone (PF) (DILAUDID) injection 0.5 mg (0.5 mg Intravenous Given 8/22/22 2008)   ondansetron (ZOFRAN) injection 4 mg (4 mg Intravenous Given 8/22/22 2006)       Disposition:  The patient was transferred to the OR under the care of Dr. Flores.     Impression & Plan     CMS Diagnoses: None    Medical Decision Making:  This patient is a pleasant 81-year-old female with history of Crohn's disease status post complete colectomy and ileostomy formation with history of multiple bowel obstructions as well as parastomal hernias who presents today with the same.  CT shows closed-loop obstruction with likely internal hernia.  Clinically she is  stable.  She does have a markedly elevated white count.  I have been in touch with both general surgery as well as colorectal surgery who plans to take her to the OR and a joint operation.  She will be coming into the hospitalist service postop.    Covid-19  Merlene River was evaluated during a global COVID-19 pandemic, which necessitated consideration that the patient might be at risk for infection with the SARS-CoV-2 virus that causes COVID-19.   Applicable protocols for evaluation were followed during the patient's care.   COVID-19 was considered as part of the patient's evaluation. The plan for testing is:  a test was obtained during this visit.    Diagnosis:    ICD-10-CM    1. Complete intestinal obstruction, unspecified cause (H)  K56.601    2. Internal hernia  K45.8        Scribe Disclosure:  I, LINCOLN WILKINS, am serving as a scribe at 7:47 PM on 8/22/2022 to document services personally performed by Dr. Kristal MD found based on my observations and the provider's statements to me.            Luba Giraldo MD  08/22/22 8503

## 2022-08-23 NOTE — ANESTHESIA POSTPROCEDURE EVALUATION
Patient: Merlene River    Procedure: Procedure(s):  EXPLORATORY LAPAROTOMY, LYSIS OF ADHESIONS, REPAIR OF INTERNAL HERNIA AND PERISTOMAL HERNIA       Anesthesia Type:  General    Note:  Disposition: Inpatient   Postop Pain Control: Uneventful            Sign Out: Well controlled pain   PONV: No   Neuro/Psych: Uneventful            Sign Out: Acceptable/Baseline neuro status   Airway/Respiratory: Uneventful            Sign Out: Acceptable/Baseline resp. status   CV/Hemodynamics: Uneventful            Sign Out: Acceptable CV status   Other NRE: NONE   DID A NON-ROUTINE EVENT OCCUR? No           Last vitals:  Vitals Value Taken Time   /71 08/23/22 0400   Temp 36.4  C (97.5  F) 08/23/22 0400   Pulse 90 08/23/22 0400   Resp 16 08/23/22 0400   SpO2 96 % 08/23/22 0400       Electronically Signed By: Omari De La Torre MD  August 23, 2022  4:51 AM

## 2022-08-23 NOTE — ANESTHESIA PROCEDURE NOTES
Airway       Patient location during procedure: OR ([facility] - Operating Room or Procedural Area)       Procedure Start/Stop Times: 8/23/2022 12:26 AM  Staff -        Anesthesiologist:  Omari De La Torre MD       CRNA: Halima Ott APRN CRNA       Performed By: CRNA  Consent for Airway        Urgency: emergent       Consent: No emergent situation. Verbal consent obtained. Written consent obtained.       Consent given by: patient       Risks and benefits: risks, benefits and alternatives were discussed       Patient identity confirmed: verbally with patient, arm band and hospital-assigned identification number  Indications and Patient Condition       Indications for airway management: [place]-procedural       Induction type:RSI       Mask difficulty assessment: 0 - not attempted    Final Airway Details       Final airway type: endotracheal airway       Successful airway: ETT - single  Endotracheal Airway Details        ETT size (mm): 7.0       Cuffed: yes       Cuff volume (mL): 7       Successful intubation technique: video laryngoscopy       VL Blade Size: Glidescope 3       Adjucts: stylet       Position: Right       Measured from: gums/teeth       Secured at (cm): 21       Bite block used: None    Post intubation assessment        Number of attempts at approach: 1       Number of other approaches attempted: 0       Secured with: pink tape       Ease of procedure: easy       Dentition: Intact and Unchanged    Medication(s) Administered   Medication Administration Time: 8/23/2022 12:26 AM

## 2022-08-23 NOTE — PLAN OF CARE
Goal Outcome Evaluation:    POD 0 exploratory laparotomy, lysis of adhesions, repair of internal hernia and peristomal hernia. A&Ox4, VSS on 3L O2. PIV infusing NS 100ml/hr. Tolerating NPO. Assist of 1. Ileostomy CDI with watery stools output. Foly patent with clear/yellow output. NG intact at 60cm with brown output. Denies pain and N/V. Continue plan of care.

## 2022-08-23 NOTE — PHARMACY-ADMISSION MEDICATION HISTORY
Pharmacy Medication History  Admission medication history interview status for the 8/22/2022  admission is complete. See EPIC admission navigator for prior to admission medications     Location of Interview: Patient room  Medication history sources: Patient    Significant changes made to the medication list:      In the past week, patient estimated taking medication this percent of the time: greater than 90%    Additional medication history information:       Medication reconciliation completed by provider prior to medication history? yes    Time spent in this activity: 15min     Prior to Admission medications    Medication Sig Last Dose Taking? Auth Provider Long Term End Date   CALCIUM & MAGNESIUM CARBONATES PO Take 1 tablet by mouth 2 times daily   Yes Unknown, Entered By History     Cholecalciferol (VITAMIN D3 PO) Take 5,000 Units by mouth daily  Yes Unknown, Entered By History     COENZYME Q-10 PO Take 100 mg by mouth daily   Yes Unknown, Entered By History     Docosahexaenoic Acid (DHA PO) Take 500 mg by mouth daily   Yes Unknown, Entered By History     LIOTHYRONINE SODIUM PO Take 15 mcg by mouth daily Compounded product  Yes Unknown, Entered By History Yes    multivitamin, therapeutic (THERA-VIT) TABS Take 1 tablet by mouth daily  Yes Unknown, Entered By History     NALTREXONE HCL PO Take 3 mg by mouth At Bedtime Compounded product  Yes Unknown, Entered By History No    NONFORMULARY Apply 0.5 mLs topically 2 times daily Twice daily, Monday through Friday only. Progesterone/Bi-est 50/50 DHEA 15mg/0.1 mg. Compounded product  Yes Unknown, Entered By History     THYROID PO Take 32.5 mg by mouth daily  Yes Unknown, Entered By History No    VITAMIN K PO Take 140 mcg by mouth daily   Yes Unknown, Entered By History         The information provided in this note is only as accurate as the sources available at the time of update(s)   Claribel PenalozaD

## 2022-08-23 NOTE — PROGRESS NOTES
[facility]    Medicine Progress Note - Hospitalist Service    Date of Admission:  8/22/2022    Assessment & Plan           Merlene River is a 81 year old female admitted on 8/22/2022 after presenting to the emergency department for evaluation of abdominal pain, nausea and vomiting, and was diagnosed with a closed-loop small bowel obstruction as well as a possible secondary obstruction at parastomal hernia.    Closed loop small bowel obstruction: with SIRS.  Leukocytosis to 20.8 thousand at admit.  Mesenteric stranding on CT imaging.   Known history of bowel obstructions related to Crohn's disease, parastomal hernia.  Not on treatment for her Crohn's disease   S/p ex lap, GAVIN, repair of internal and peristomal hernias on 8/23/22 w/ CRS  -N.p.o, NGT as per CRS  -IVF at 100/h; can consider addition of dextrose pending blood glucose trend  -Acetaminophen, low-dose oxycodone, low-dose IV Dilaudid available for pain control.  -Ostomy management as per colorectal surgery     Cough: Patient with some rhonchorous breath sounds in bilateral bases at admission.  She reports several weeks of chest mucus but no cough.  Chest CT with some atelectasis and no clear infiltrate.  Did have episodes of nausea and vomiting PTA, so aspiration pneumonitis might be a possibility for a more acute finding.  -2 View chest XR pending; would initiate anti-infectives to cover aspiration pneumonia if infiltrates noted or hypoxic --> platelike atelectasis by my read, will hold off on antimicrobials for now, pending formal radiology interpretation  -Pulmonary toilet postoperatively w/ IS and acapella  -COVID testing negative    Hyperglycemia: Blood glucose of 204-->161mg/dL.  Does not have a known history of diabetes, though I have seen some mildly elevated blood glucoses through outside records.  Suspect this may be stress reaction related to small bowel obstruction coupled with some impaired glucose  tolerance  -Hemoglobin A1c 5.3% 08/23/22   -Medium dose sliding scale insulin is available every 4 hours while NPO    Hypokalemia at admit Potassium of 3.3, reportedly not yet replaced.  Poor intake over the past 2 days, nausea and vomiting.  Hypomagnesemia 1.5  Hyperchloremia secondary to 0.9% saline  -Potassium, magnesium replacement protocols in place will be repleted  -Add on phosphorus level and phosphorus replacement protocol, goal is balanced electrolytes to minimize risk of ileus  - BMPon am 08/24/22 and then can replete deficits prn  -Change IV fluids to LR    Hypothyroidism:  -Plan to resume prior to admission thyroid supplementation at discharge    Risk for delirium given age, recent anesthetic exposure, likely poor sleep  -maximize delirium preventive measures including preservation of usual sleep/wake cycle, provision of sensory aids, early mobility, bowel/bladder function, pain control, frequent reorientation, avoid deliriogenic meds, etc  -discontinue benadryl         Diet: NPO for Medical/Clinical Reasons Except for: Meds, Ice Chips    DVT Prophylaxis: Heparin SQ and Pneumatic Compression Devices  Mayfield Catheter: PRESENT, indication: /GI/GYN Pelvic Procedure  Central Lines: None  Cardiac Monitoring: None  Code Status: Full Code      Disposition Plan Contingent upon postoperative course     Expected Discharge Date: 08/26/2022                The patient's care was discussed with the Attending Physician, Dr. Cynthia Mancera, Bedside Nurse and Patient.    Total time is 25 minutes of which 15 minutes was face-to-face time interviewing and examining patient and outlining the above described plan of care    ALIRIO Fisher Carrie Tingley Hospitalist Service  [facility]  Securely message with the Vocera Web Console (learn more here)  [phone]    Clinically Significant Risk Factors Present on Admission                    # Overweight: Estimated body mass index is 27.34 kg/m  as  calculated from the following:    Height as of this encounter: 1.524 m (5').    Weight as of this encounter: 63.5 kg (140 lb).      -increases all cause morbidity and mortality  -Outpatient follow-up  ______________________________________________________________________    Interval History   Pt underwent surgical repair of SBO w/ GETA, ex lap, GAVIN, repair internal and peristomal hernias, ebl 30mL. Hemodynamically stable, on 3L NC post op.     Pt reports nasal congestion that goes down to her throat.  She clarified that her chest congestion/mucus and cough has been going on for several weeks.  She reports having gotten some sleep and that her pain control is acceptable until she tries to use IS.  She's wondering when she can get up OOB     Data reviewed today: I reviewed all medications, new labs and imaging results over the last 24 hours. I personally reviewed no images or EKG's today.    Physical Exam   Vital Signs: Temp: 97.8  F (36.6  C) Temp src: Oral BP: 127/70 Pulse: 90   Resp: 16 SpO2: 96 % O2 Device: Nasal cannula Oxygen Delivery: 3 LPM  Weight: 140 lbs 0 oz    Constitutional: vs as per EMR  General:  adult pt lying in bed without acute distress  Neuro: +follows commands wiggle toes and show 2 fingers bilat, face symmetric, tongue midline, speech fluent, oriented x3, cam neg  Eyes pupils equal round 3mm briskly reactive bilat, sclera nonicteric, noninjected, conjunctiva pink,  Head, ENT & mouth: NC/AT,  dry oral mucosa  Neck: supple  CV S1S2 no murmurs  resp: CTAB upper and lower lobes no wheezes, rales or rhonchi  gi:hypoactive bowel sounds, soft, nontender, nondisteded, midline incision w/ small amount of shadowing, RLQ stoma beefy red, moderate amount of bilious output in ostomy bag  Ext: no edema or mottling  Skin: no rashes on exposed skin  Lymph: defer  Musculoskeletal no bony joint deformities    Data

## 2022-08-23 NOTE — CONSULTS
[facility]  Colon and Rectal Surgery Consult Note  Name: Merlene River    MRN: [id number]  YOB: 1940    Age: 81 year old  Date of admission: 8/22/2022  Primary care provider: Maria De Jesus Ceballos     Requesting Physician:  Dr. Giraldo  Reason for consult:  Closed loop small bowel obstruction           History of Present Illness:   Merlene River is a 81 year old female, seen at the request of Dr. Giraldo, presents with abdominal pain.  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  She has had multiple hospitalizations for small bowel obstructions.  She had surgery in 2012 for small bowel obstruction requiring small bowel resection.  She recently had another surgery in the within the past year in [place] for small bowel obstruction due to parastomal hernia which was repaired by general surgeon in [place].  She was doing well until Saturday when she noticed an abdominal pain.  This worsened and today she had acute onset of pain associated with nausea and vomiting.  She also reports little to no output from her ileostomy.  She was brought into the emergency department by EMS.  CT scan on arrival demonstrated closed-loop small bowel obstruction and possible obstruction associated with parastomal hernia.  There is no evidence of pneumatosis, free air.  Her white count was 20,000.  She currently appears comfortable in the ER.  She has received a several doses of IV dilaudid.  Her daughter reports that she has passed some liquid stool into her stoma since coming to the ER.          Colonoscopy History:  2006 - prior to TPC with end ileostomy              Past Medical History:     Past Medical History:   Diagnosis Date     Crohn's colitis (H)              Past Surgical History:     Past Surgical History:   Procedure Laterality Date     colon resesection       GI SURGERY      Ostomy               Social History:      Social History     Tobacco Use     Smoking status: Former Smoker     Packs/day: 0.00     Smokeless tobacco: Never Used   Substance Use Topics     Alcohol use: Yes     Comment: rarely             Family History:   No family history on file.          Allergies:     Allergies   Allergen Reactions     Banana      Constipation     Contrast Dye      Diatrizoate      Contrast Dye   Other reaction(s): Confusion     Garlic Cramps     Gluten Meal      Reactive arthritis      Onion Cramps             Medications:             Review of Systems:   A comprehensive greater than 10 system review of systems was carried out.  Pertinent positives and negatives are noted above.  Otherwise negative for contributory info.            Physical Exam:     Blood pressure (!) 155/77, temperature 97.6  F (36.4  C), temperature source Oral, resp. rate 18, height 1.524 m (5'), weight 63.5 kg (140 lb), SpO2 94 %, not currently breastfeeding.  No intake or output data in the 24 hours ending 08/22/22 2241  Exam:  General - Awake alert and oriented, appears stated age  Pulm - Non-labored breathing with normal respiratory effort  CVS - reg rate and rhythm, no peripheral edema  Abd - soft, mild distention, mild umbilical and parastomal hernia tenderness with palpation.  Rectal exam was not performed.   Neuro - CN II-XII grossly intact  Musculoskeletal - extremities with no clubbing, cyanosis or edema; able to ambulate  Psych - responsive, alert, cooperative; oriented x3; appropriate mood and affect  External/skin - inspection reveals no rashes, lesions or ulcers, normal coloring           Data Reviewed:     Recent Results (from the past 24 hour(s))   CT Abdomen Pelvis w/o Contrast   Result Value    Radiologist flags Closed-loop small bowel obstruction (AA)    Narrative    EXAM: CT ABDOMEN PELVIS W/O CONTRAST  LOCATION: [facility]  DATE/TIME: 8/22/2022 8:29 PM    INDICATION: Abdominal pain. History of small bowel  obstruction.  COMPARISON: MR enterography 08/27/2019  TECHNIQUE: CT scan of the abdomen and pelvis was performed without IV contrast. Multiplanar reformats were obtained. Dose reduction techniques were used.  CONTRAST: None.    FINDINGS:   LOWER CHEST: Bibasilar subsegmental atelectasis. No focal airspace disease.    HEPATOBILIARY: Normal.    PANCREAS: Normal.    SPLEEN: Normal.    ADRENAL GLANDS: Mild bilateral adrenal gland thickening, nonspecific. No discrete nodules.    KIDNEYS/BLADDER: Normal.    BOWEL: Status post total colectomy with right lower quadrant end ileostomy. Dilated mid small bowel loops, measuring up to 4.0 cm in diameter. There is mesenteric swirling with 2 closely apposed transition points, suspicious for closed loop obstruction.   Additionally, there is a parastomal hernia, with a dilated loop of small bowel measuring 3.2 cm in diameter. There is mesenteric edema in the right lower quadrant and within the parastomal hernia. No pneumatosis.    PERITONEUM/RETROPERITONEUM: Trace intraperitoneal free fluid. No focal fluid collection. No intraperitoneal free air.    LYMPH NODES: No lymphadenopathy.    VASCULATURE: Mild atherosclerotic calcifications.    PELVIC ORGANS: Unremarkable.    MUSCULOSKELETAL: Multilevel compression fractures of the thoracic and lumbar spine, which appear chronic.      Impression    IMPRESSION:   1.  Closed-loop small bowel obstruction with associated mesenteric swirling and edema, suspicious for internal hernia. Additional parastomal hernia containing a dilated small bowel loop, which may be a secondary site of obstruction. No pneumatosis or   intraperitoneal free air.      [Critical Result: Closed-loop small bowel obstruction]    Finding was identified on 8/22/2022 8:30 PM.     Dr. Giraldo was contacted by me on 8/22/2022 8:50 PM and verbalized understanding of the critical result.            Recent Labs   Lab 08/22/22 2000   WBC 20.8*   HGB 13.8   HCT 40.9   MCV 87   PLT  243          Lab Results   Component Value Date     08/22/2022     07/17/2016     07/15/2016     07/14/2016    Lab Results   Component Value Date    CHLORIDE 110 08/22/2022    CHLORIDE 106 07/17/2016    CHLORIDE 96 07/15/2016    CHLORIDE 89 07/14/2016    Lab Results   Component Value Date    BUN 22 08/22/2022    BUN 10 07/17/2016    BUN 15 07/15/2016    BUN 20 07/14/2016      Lab Results   Component Value Date    POTASSIUM 3.3 08/22/2022    POTASSIUM 3.4 07/17/2016    POTASSIUM 3.5 07/15/2016    POTASSIUM 3.4 07/14/2016    Lab Results   Component Value Date    CO2 23 08/22/2022    CO2 26 07/17/2016    CO2 32 07/15/2016    CO2 28 07/14/2016    Lab Results   Component Value Date    CR 1.01 08/22/2022    CR 0.78 07/17/2016    CR 0.84 07/15/2016    CR 0.83 07/14/2016            Assessment and Plan:   This is an 81 year old female with a history of crohn's colitis s/p TPC with EI in 2006, multiple SBOs and know parastomal hernia who presents with abdominal pain, n/v.  CT scan suspicious for closed loop obstruction and possible involvement of parastomal hernia.  Patient not peritoneal on exam but has received pain medication recently.  She recent had surgery in [place] for SBO due to her parastomal hernia.  Given her CT findings, prior history, concern for possible bowel ischemia, recommend emergent operative exploration, possible bowel resection, possible stoma revision and possible hernia repair.  I have spoke with Dr. Wright from general surgery who said he would be available if need for abdominal wall/hernia repair. I discussed the risks including bleeding, infection, damage to surrounding structures, cardiopulmonary complications with the patient and her daughter and she agrees to proceed.      Plan:  1. Admit to Hospitalist  2. Surgery: (non-op, OR, elective, emergent, urgent)emergent  3. Diet: NPO - will likely have NGT after surgery  4. IV Fluids: yes  5. Antibiotics:  Pre-op  6. Medications:   (ie. Home  meds to give/hold, blood thinners)hold home meds  7. I&O s:  strict I&O s, (stoma output?, mata?)mata to be placed intra-op  8. Labs:   - Reviewed: White count 20K  - Ordered: morning labs   9. Imaging:   - I have personally viewed: CT abd/pelvis  - Ordered:  None  10. Activity:  (ambulate, PT/OT)up ad chris post-op  11. DVT prophylaxis: SCD s,  Will start chemical ppx post-op    Patient specific identified risk factors considered as part of today s evaluation include: Multiple prior abdominal surgeries, crohn's (Blood thinners, BMI>30, Smoker, Diabetic etc. etc.)      Additional history obtained from Patient and Dr. Giraldo.  Time spent on consultation: 45 min greater than 50% of total encounter time was spent in counseling and or coordination of care.            Gosia Flores MD  [facility]  Phone: [phone]  Fax: [phone]

## 2022-08-23 NOTE — PLAN OF CARE
POD 1 from a hernia repair. A&O. CMS intact. Bowel sounds +x4, - flatus, tolerating ice chips. NG to low intermittent suction - minimal output, clamped while ambulating halls x2 & well tolerated. VSS on 2L O2, 4L while ambulating. Dressing saturated - MD batista, directed RN to change dressing - appears that 2 staples did not cross over incision & wound is not fully approximated, reinforced with steri strips & new island dressing applied. Ileostomy pink with 100 mL watery brown output. Up with SBA, GB. C/o moderate to severe pain, decreased with IV dilaudid. Mata discontinued at 1530 - small void right after mata was removed. Pt scoring green on the Aggression Stop Light Tool. Continue to monitor.       Hide Include Location In Plan Question?: No Detail Level: Generalized

## 2022-08-23 NOTE — ANESTHESIA CARE TRANSFER NOTE
Patient: Merlene River    Procedure: Procedure(s):  EXPLORATORY LAPAROTOMY, LYSIS OF ADHESIONS, REPAIR OF INTERNAL HERNIA AND PERISTOMAL HERNIA       Diagnosis: Internal hernia [K45.8]  Diagnosis Additional Information: No value filed.    Anesthesia Type:   General     Note:    Oropharynx: oral airway in place  Level of Consciousness: awake  Oxygen Supplementation: face mask  Level of Supplemental Oxygen (L/min / FiO2): 6  Independent Airway: airway patency satisfactory and stable  Dentition: dentition unchanged  Vital Signs Stable: post-procedure vital signs reviewed and stable  Report to RN Given: handoff report given  Patient transferred to: PACU    Handoff Report: Identifed the Patient, Identified the Reponsible Provider, Reviewed the pertinent medical history, Discussed the surgical course, Reviewed Intra-OP anesthesia mangement and issues during anesthesia, Set expectations for post-procedure period and Allowed opportunity for questions and acknowledgement of understanding      Vitals:  Vitals Value Taken Time   BP     Temp     Pulse     Resp     SpO2         Electronically Signed By: ALIRIO Meyer CRNA  August 23, 2022  3:23 AM

## 2022-08-23 NOTE — PROGRESS NOTES
COLON & RECTAL SURGERY  PROGRESS NOTE    August 23, 2022  Post-op Day #0    SUBJECTIVE:  Patient up to chair. Having abdominal pain, does not feel dilaudid dose was adequate. NGT not hooked up to suction at the moment.     OBJECTIVE:  Temp:  [97.2  F (36.2  C)-99  F (37.2  C)] 98.6  F (37  C)  Pulse:  [90-93] 93  Resp:  [12-18] 16  BP: (102-155)/() 139/61  SpO2:  [94 %-98 %] 96 %    Intake/Output Summary (Last 24 hours) at 8/23/2022 1000  Last data filed at 8/23/2022 0640  Gross per 24 hour   Intake 2000 ml   Output 130 ml   Net 1870 ml       GENERAL:  Awake, alert, no acute distress  HEAD: Normocephalic atraumatic  SCLERA: Anicteric  EXTREMITIES: Warm and well perfused  ABDOMEN:  Soft, appropriately tender, non-distended. No guarding, rigidity, or peritoneal signs. Stoma in RLQ pink and protruding with dark, bilious output present in bag.   INCISION:  Island dressing in place, serosanguinous drainage.     LABS:  Lab Results   Component Value Date    WBC 19.1 08/23/2022    WBC 5.7 07/17/2016     Lab Results   Component Value Date    HGB 13.1 08/23/2022    HGB 13.5 07/17/2016     Lab Results   Component Value Date    HCT 37.9 08/23/2022    HCT 38.9 07/17/2016     Lab Results   Component Value Date     08/23/2022     07/17/2016     Last Basic Metabolic Panel:  Lab Results   Component Value Date     08/23/2022     07/17/2016      Lab Results   Component Value Date    POTASSIUM 3.8 08/23/2022    POTASSIUM 3.4 07/17/2016     Lab Results   Component Value Date    CHLORIDE 112 08/23/2022    CHLORIDE 106 07/17/2016     Lab Results   Component Value Date    KATLYN 8.3 08/23/2022    KATLYN 8.5 07/17/2016     Lab Results   Component Value Date    CO2 24 08/23/2022    CO2 26 07/17/2016     Lab Results   Component Value Date    BUN 22 08/23/2022    BUN 10 07/17/2016     Lab Results   Component Value Date    CR 0.83 08/23/2022    CR 0.78 07/17/2016     Lab Results   Component Value Date      08/23/2022     08/23/2022    GLC 86 07/17/2016       ASSESSMENT/PLAN: 81 F with h/o Crohn's who presented with closed loop SBO, now s/p exploratory laparotomy, lysis of adhesions, repair of internal hernia and parastomal hernia. AVSS.     1. NPO diet. NGT to LIS.   2. PRN IV pain meds, increased dose range of dilaudid for pain  3. Continue IVF. Discussed TPN, patient would like to hold off for today and re-evaluate need for TPN tomorrow.   4. Continue Mayfield  5. OOB, ambulate  6. Heparin subQ for ppx    Discussed with Dr. Flores.     For questions/paging, please contact the CRS office at [phone].    Edie Gross PA-C  Colorectal Physician Assistant    [facility]  [address]  [place] [id number]  T: [phone]  F: [phone]

## 2022-08-23 NOTE — ANESTHESIA PREPROCEDURE EVALUATION
Anesthesia Pre-Procedure Evaluation    Patient: Merlene River   MRN: [id number] : 1940        Procedure : Procedure(s):  EXPLORATORY LAPAROTOMY, POSSIBLE BOWEL RESECTION, POSSIBLE STOMA, POSSIBLE HERNIA REPAIR          Past Medical History:   Diagnosis Date     Crohn's colitis (H)       Past Surgical History:   Procedure Laterality Date     colon resesection       GI SURGERY      Ostomy      Allergies   Allergen Reactions     Banana      Constipation     Contrast Dye      Diatrizoate      Contrast Dye   Other reaction(s): Confusion     Garlic Cramps     Gluten Meal      Reactive arthritis      Onion Cramps      Social History     Tobacco Use     Smoking status: Former Smoker     Packs/day: 0.00     Smokeless tobacco: Never Used   Substance Use Topics     Alcohol use: Yes     Comment: rarely      Wt Readings from Last 1 Encounters:   22 63.5 kg (140 lb)        Anesthesia Evaluation            ROS/MED HX  ENT/Pulmonary:     (+) tobacco use, Past use,     Neurologic:  - neg neurologic ROS     Cardiovascular:    (-) CAD and ACEVEDO   METS/Exercise Tolerance: >4 METS    Hematologic:  - neg hematologic  ROS     Musculoskeletal:  - neg musculoskeletal ROS     GI/Hepatic: Comment: H/o SBO  stoma    (+) Inflammatory bowel disease,     Renal/Genitourinary:  - neg Renal ROS     Endo:  - neg endo ROS     Psychiatric/Substance Use:  - neg psychiatric ROS     Infectious Disease:  - neg infectious disease ROS     Malignancy:       Other:            Physical Exam    Airway        Mallampati: II   TM distance: > 3 FB   Neck ROM: full   Mouth opening: > 3 cm    Respiratory Devices and Support         Dental  no notable dental history         Cardiovascular   cardiovascular exam normal          Pulmonary   pulmonary exam normal                OUTSIDE LABS:  CBC:   Lab Results   Component Value Date    WBC 20.8 (H) 2022    WBC 5.7 2016    HGB 13.8 2022    HGB 13.5 2016    HCT 40.9 2022    HCT  38.9 07/17/2016     08/22/2022     07/17/2016     BMP:   Lab Results   Component Value Date     08/22/2022     07/17/2016    POTASSIUM 3.3 (L) 08/22/2022    POTASSIUM 3.4 07/17/2016    CHLORIDE 110 (H) 08/22/2022    CHLORIDE 106 07/17/2016    CO2 23 08/22/2022    CO2 26 07/17/2016    BUN 22 08/22/2022    BUN 10 07/17/2016    CR 1.01 08/22/2022    CR 0.78 07/17/2016     (H) 08/22/2022    GLC 86 07/17/2016     COAGS:   Lab Results   Component Value Date    INR 0.95 09/22/2015     POC: No results found for: BGM, HCG, HCGS  HEPATIC:   Lab Results   Component Value Date    ALBUMIN 3.4 08/22/2022    PROTTOTAL 7.0 08/22/2022    ALT 28 08/22/2022    AST 20 08/22/2022    ALKPHOS 92 08/22/2022    BILITOTAL 0.6 08/22/2022     OTHER:   Lab Results   Component Value Date    LACT 0.9 07/14/2016    KATLYN 8.8 08/22/2022    PHOS 3.1 07/05/2016    MAG 1.7 07/05/2016    LIPASE 113 08/22/2022       Anesthesia Plan    ASA Status:  3, emergent    NPO Status:  ELEVATED Aspiration Risk/Unknown    Anesthesia Type: General.     - Airway: ETT   Induction: Intravenous, RSI, Propofol.   Maintenance: Balanced.   Techniques and Equipment:     - Lines/Monitors: 2nd IV     Consents    Anesthesia Plan(s) and associated risks, benefits, and realistic alternatives discussed. Questions answered and patient/representative(s) expressed understanding.    - Discussed:     - Discussed with:  Patient         Postoperative Care    Pain management: IV analgesics, Multi-modal analgesia.   PONV prophylaxis: Ondansetron (or other 5HT-3), Dexamethasone or Solumedrol, Background Propofol Infusion     Comments:                Omari De La Torre MD

## 2022-08-23 NOTE — ED TRIAGE NOTES
Hx of SBO. requiring hospital admission 1/ year. Vomitted x1 today. Decreased ostomy output today. Pain to upper abdo that started yesterday.

## 2022-08-23 NOTE — H&P
[facility]    History and Physical - Hospitalist Service       Date of Admission:  8/22/2022    Assessment & Plan      Merlene River is a 81 year old female admitted on 8/22/2022. She presents to the emergency department for evaluation of abdominal pain, nausea and vomiting, and is found to have a closed-loop small bowel obstruction as well as a possible secondary obstruction at parastomal hernia.    Closed loop small bowel obstruction: with SIRS.  Leukocytosis to 20.8 thousand.  Mesenteric stranding on CT imaging.  Mild hypertension at 155/77.  Known history of bowel obstructions related to Crohn's disease, parastomal hernia.  Not on treatment for her Crohn's disease other than  -N.p.o.  -Patient is going directly to the operating room for surgical intervention on closed-loop bowel obstruction.  Colorectal surgery consulted and aware of patient from ER  -NG to be placed to low intermittent suction  -Normal saline at 100/h; can consider addition of dextrose pending blood glucose trend  -Acetaminophen, low-dose oxycodone, low-dose IV Dilaudid available for pain control.  -Ostomy management as per colorectal surgery pending surgical plan/treatment.    Cough: Patient with some rhonchorous breath sounds in bilateral bases.  She reports for the past 3 months or so she has had a cough.  Chest CT with some atelectasis and no clear infiltrate.  Did have episodes of nausea and vomiting earlier today, so aspiration pneumonitis might be a possibility for a more acute finding.  -2 View chest XR; would initiate anti-infectives to cover aspiration pneumonia if infiltrates noted or hypoxic  -Pulmonary toilet postoperatively  -COVID testing pending    Hyperglycemia: Blood glucose of 204.  Does not have a known history of diabetes, though I have seen some mildly elevated blood glucoses through outside records.  Suspect this may be stress reaction related to small bowel obstruction coupled with some impaired  glucose tolerance  -Hemoglobin A1c pending  -Medium dose sliding scale insulin is available every 4 hours while NPO    Hypokalemia: Potassium of 3.3.  Poor intake over the past 2 days, nausea and vomiting.  -Potassium, magnesium replacement protocols in place    Hypothyroidism:  -Plan to resume prior to admission thyroid supplementation at discharge       Diet:  N.p.o. per anesthesia guidelines.  Advancement of diet thereafter per surgical team  DVT Prophylaxis: Pneumatic Compression Devices  [place] Catheter: Not present  Central Lines: None  Cardiac Monitoring: None  Code Status:    Full code. Confirmed with patient on admission.    Clinically Significant Risk Factors Present on Admission                    # Overweight: Estimated body mass index is 27.34 kg/m  as calculated from the following:    Height as of this encounter: 1.524 m (5').    Weight as of this encounter: 63.5 kg (140 lb).        Disposition Plan      Anticipated duration of hospitalization likely 3 to 7 days pending return of bowel function postoperatively.    The patient's care was discussed with the Patient, patient's daughter at bedside, Dr. Giraldo in the emergency department.    Esvin Jones MD  Hospitalist Service  [facility]  Securely message with the Vocera Web Console (learn more here)  Text page via [employer] Paging/Directory         ______________________________________________________________________    Chief Complaint   Abdominal pain, nausea and vomiting    History is obtained from the patient, chart review, discussion with ER provider, patient's daughter at bedside, review of outside records including prior hospitalization in [place] March 2021 regarding exploratory laparoscopy with no bowel resection at that time.    History of Present Illness   Merlene River is a 81 year old female who presents to the emergency department for evaluation of abdominal pain, nausea and vomiting.  She reports that the onset  of her symptoms was approximately this Saturday.  She, at that time, describes a stomach ache.  Her family at bedside tells me that she may have had some similar symptoms even in the week prior, though still minor.  Abdominal discomfort waxed and waned, though generally persisted, and today became associated with nausea and vomiting.  With episodes of nausea and retching, patient tells me that she had cold sweats, though no fevers.  With nausea and abdominal pain, patient presented to the emergency department for evaluation where a CT demonstrated a closed-loop mid small bowel obstruction as well as a possible secondary obstruction and parastomal hernia.    Merlene has a longstanding history of Crohns disease and associated iron deficiency anemia, osteoporosis. She underwent a total proctocolectomy and ileostomy in 2009 w/ Dr Prince of colorectal surgery pursued after intermittent (and at that time, increasing) symptoms associated with a known colonic stricture.    Has occasional hospitalizations associated with bowel obstructions. Most recently hospitalized while in [place] 3/9/21 with a peristomal hernia. She underwent an exploratory laparotomy w/ parastomal hernia repair at that time (no bowel resection).  Hernia repair was unsuccessful, and patient was told that her ostomy site would need to be moved if hernia repair was desired.  Note at that time, patient also with SIRS associated with her presentation.  Elevated leukocytosis and tachycardia at that time.    That was patient's last surgery associated with bowel obstruction.  She tells me that over the past several months, she has noted some increased bulging lateral of her ostomy.    Patient reports that she had decreased ostomy output over today, last emptied ostomy at approximately 11 AM, only a small amount of stool in ostomy currently.  Stoma is not bulging or tender.    Patient tells me that she has had no issues with postoperative nausea or vomiting, though she  occasionally is slow to wake up postoperatively.    Patient rates her abdominal pain is 1 out of 10 around ostomy, 3/10 mid abdomen.    Review of Systems    The 10 point Review of Systems is negative other than noted in the HPI or here.  No fevers  No shortness of breath  Reports for the past several months she has had a cough    Past Medical History    I have reviewed this patient's medical history and updated it with pertinent information if needed.   Past Medical History:   Diagnosis Date     Crohn's colitis (H)    Recurrent small bowel obstructions    Past Surgical History   I have reviewed this patient's surgical history and updated it with pertinent information if needed.  Past Surgical History:   Procedure Laterality Date     colon resesection       GI SURGERY      Ostomy   Total proctocolectomy and ileostomy in 2009 w/ Dr Prince of colorectal surgery  Tubal ligation  Anal fistulectomy x2  Tonsillectomy    Social History   I have reviewed this patient's social history and updated it with pertinent information if needed.  Social History     Tobacco Use     Smoking status: Former Smoker     Packs/day: 0.00     Smokeless tobacco: Never Used   Substance Use Topics     Alcohol use: Yes     Comment: rarely     Drug use: No       Family History      No family history of crohns disease; patient tells me that her mother had colitis, though not Crohn's colitis, and is not certain which type  She reports a cousin has self diagnosed celiac disease  Father with low blood pressure  Mother and sisters with high blood pressure    Prior to Admission Medications   Prior to Admission Medications   Prescriptions Last Dose Informant Patient Reported? Taking?   CALCIUM & MAGNESIUM CARBONATES PO   Yes No   Sig: Take 1 tablet by mouth 2 times daily    COENZYME Q-10 PO   Yes No   Sig: Take 100 mg by mouth daily    Cholecalciferol (VITAMIN D3 PO)   Yes No   Sig: Take 5,000 Units by mouth daily   Docosahexaenoic Acid (DHA PO)   Yes No    Sig: Take 500 mg by mouth daily    LIOTHYRONINE SODIUM PO   Yes No   Sig: Take 15 mcg by mouth daily Compounded product   NALTREXONE HCL PO   Yes No   Sig: Take 3 mg by mouth At Bedtime Compounded product   NONFORMULARY   Yes No   Sig: Apply 0.5 mLs topically 2 times daily Twice daily, Monday through Friday only. Progesterone/Bi-est 50/50 DHEA 15mg/0.1 mg. Compounded product   Omega-3 Fatty Acids (OMEGA-3 FISH OIL PO)   Yes No   Sig: Take 1 g by mouth daily   THYROID PO   Yes No   Sig: Take 32.5 mg by mouth daily Compounded product   VITAMIN K PO   Yes No   Sig: Take 140 mcg by mouth daily    multivitamin, therapeutic (THERA-VIT) TABS   Yes No   Sig: Take 1 tablet by mouth daily   vitamin  B complex with vitamin C (VITAMIN  B COMPLEX) TABS   Yes No   Sig: Take 1 tablet by mouth daily      Facility-Administered Medications: None     Allergies   Allergies   Allergen Reactions     Banana      Constipation     Contrast Dye      Diatrizoate      Contrast Dye   Other reaction(s): Confusion     Garlic Cramps     Gluten Meal      Reactive arthritis      Onion Cramps       Physical Exam   Vital Signs: Temp: 97.6  F (36.4  C) Temp src: Oral BP: (!) 155/77     Resp: 18 SpO2: 94 % O2 Device: Nasal cannula (88 ra put on 2 l) Oxygen Delivery: 2 LPM  Weight: 140 lbs 0 oz    General Appearance: Well-appearing 81-year-old female who appears younger than stated age.  She is in no acute distress.  Eyes: No scleral icterus or injection.  Hearing aid in place  HEENT: Normocephalic and atraumatic  Respiratory: Rhonchi on inspiration through bilateral lung fields to mid lung fields.  No wheezing.  No increased work of breathing.  No cough  Cardiovascular: Regular rate and rhythm with heart rate currently in the 80s.  GI: Right lower quadrant ostomy in place with a small amount of liquid stool present.  Some lateral parastomal hernia which is distended, though not tense.  Abdomen is mildly uncomfortable palpation.  Bowel sounds are not  present.  Lymph/Hematologic: No lower extremity edema  Genitourinary: Not examined  Skin: No jaundice, no petechiae  Musculoskeletal: Muscular tone and bulk intact in all extremities.  Appropriate for age.  Neurologic: Alert, conversant, appropriate conversation.  Mental status is grossly intact.  Psychiatric: Very pleasant, normal affect    Data   Data reviewed today: I reviewed all medications, new labs and imaging results over the last 24 hours. I personally reviewed the abdominal CT image(s) showing Dilated loops of small bowel consistent w/ SBO. Stranding of mesentery as well as surrounding peristomal hernia..    Recent Labs   Lab 08/22/22 2000   WBC 20.8*   HGB 13.8   MCV 87         POTASSIUM 3.3*   CHLORIDE 110*   CO2 23   BUN 22   CR 1.01   ANIONGAP 7   KATLYN 8.8   *   ALBUMIN 3.4   PROTTOTAL 7.0   BILITOTAL 0.6   ALKPHOS 92   ALT 28   AST 20   LIPASE 113

## 2022-08-23 NOTE — ED NOTES
Ed   [facility]  ED Nurse Handoff Report    ED Chief complaint: Abdominal Pain (Limited ostomy output over last 10 days)      ED Diagnosis: Closed loop bowel obstruction  Final diagnoses:   None       Code Status: Discuss with admit MD    Allergies: See list  Allergies   Allergen Reactions     Banana      Constipation     Contrast Dye      Diatrizoate      Contrast Dye   Other reaction(s): Confusion     Garlic Cramps     Gluten Meal      Reactive arthritis      Onion Cramps       Patient Story: Has hx of SBO, Has ostomy, low output in last day. States its been blocked for 10 days. Vomit x 1. EMS gave zofran fentanyl and dilaudid with no relief.  Focused Assessment:  GCS 15   Lungs: clear t/o  CVS no cp s1 s2 no periph edema  abdo ++ tender upper. SBO has no to low output in ostomy. confimred SBO. NPO. No releif with meds  NO gu issues.    Treatments and/or interventions provided: meds for pain control and nausea  Results for orders placed or performed during the hospital encounter of 08/22/22   CT Abdomen Pelvis w/o Contrast     Status: Abnormal   Result Value Ref Range    Radiologist flags Closed-loop small bowel obstruction (AA)     Narrative    EXAM: CT ABDOMEN PELVIS W/O CONTRAST  LOCATION: [facility]  DATE/TIME: 8/22/2022 8:29 PM    INDICATION: Abdominal pain. History of small bowel obstruction.  COMPARISON: MR enterography 08/27/2019  TECHNIQUE: CT scan of the abdomen and pelvis was performed without IV contrast. Multiplanar reformats were obtained. Dose reduction techniques were used.  CONTRAST: None.    FINDINGS:   LOWER CHEST: Bibasilar subsegmental atelectasis. No focal airspace disease.    HEPATOBILIARY: Normal.    PANCREAS: Normal.    SPLEEN: Normal.    ADRENAL GLANDS: Mild bilateral adrenal gland thickening, nonspecific. No discrete nodules.    KIDNEYS/BLADDER: Normal.    BOWEL: Status post total colectomy with right lower quadrant end ileostomy. Dilated mid  small bowel loops, measuring up to 4.0 cm in diameter. There is mesenteric swirling with 2 closely apposed transition points, suspicious for closed loop obstruction.   Additionally, there is a parastomal hernia, with a dilated loop of small bowel measuring 3.2 cm in diameter. There is mesenteric edema in the right lower quadrant and within the parastomal hernia. No pneumatosis.    PERITONEUM/RETROPERITONEUM: Trace intraperitoneal free fluid. No focal fluid collection. No intraperitoneal free air.    LYMPH NODES: No lymphadenopathy.    VASCULATURE: Mild atherosclerotic calcifications.    PELVIC ORGANS: Unremarkable.    MUSCULOSKELETAL: Multilevel compression fractures of the thoracic and lumbar spine, which appear chronic.      Impression    IMPRESSION:   1.  Closed-loop small bowel obstruction with associated mesenteric swirling and edema, suspicious for internal hernia. Additional parastomal hernia containing a dilated small bowel loop, which may be a secondary site of obstruction. No pneumatosis or   intraperitoneal free air.      [Critical Result: Closed-loop small bowel obstruction]    Finding was identified on 8/22/2022 8:30 PM.     Dr. Giraldo was contacted by me on 8/22/2022 8:50 PM and verbalized understanding of the critical result.      Comprehensive metabolic panel     Status: Abnormal   Result Value Ref Range    Sodium 140 133 - 144 mmol/L    Potassium 3.3 (L) 3.4 - 5.3 mmol/L    Chloride 110 (H) 94 - 109 mmol/L    Carbon Dioxide (CO2) 23 20 - 32 mmol/L    Anion Gap 7 3 - 14 mmol/L    Urea Nitrogen 22 7 - 30 mg/dL    Creatinine 1.01 0.52 - 1.04 mg/dL    Calcium 8.8 8.5 - 10.1 mg/dL    Glucose 204 (H) 70 - 99 mg/dL    Alkaline Phosphatase 92 40 - 150 U/L    AST 20 0 - 45 U/L    ALT 28 0 - 50 U/L    Protein Total 7.0 6.8 - 8.8 g/dL    Albumin 3.4 3.4 - 5.0 g/dL    Bilirubin Total 0.6 0.2 - 1.3 mg/dL    GFR Estimate 56 (L) >60 mL/min/1.73m2   Lipase     Status: Normal   Result Value Ref Range    Lipase 113  73 - 393 U/L   CBC with platelets and differential     Status: Abnormal   Result Value Ref Range    WBC Count 20.8 (H) 4.0 - 11.0 10e3/uL    RBC Count 4.72 3.80 - 5.20 10e6/uL    Hemoglobin 13.8 11.7 - 15.7 g/dL    Hematocrit 40.9 35.0 - 47.0 %    MCV 87 78 - 100 fL    MCH 29.2 26.5 - 33.0 pg    MCHC 33.7 31.5 - 36.5 g/dL    RDW 13.2 10.0 - 15.0 %    Platelet Count 243 150 - 450 10e3/uL    % Neutrophils 91 %    % Lymphocytes 4 %    % Monocytes 4 %    % Eosinophils 0 %    % Basophils 0 %    % Immature Granulocytes 1 %    NRBCs per 100 WBC 0 <1 /100    Absolute Neutrophils 18.9 (H) 1.6 - 8.3 10e3/uL    Absolute Lymphocytes 0.8 0.8 - 5.3 10e3/uL    Absolute Monocytes 0.9 0.0 - 1.3 10e3/uL    Absolute Eosinophils 0.0 0.0 - 0.7 10e3/uL    Absolute Basophils 0.1 0.0 - 0.2 10e3/uL    Absolute Immature Granulocytes 0.1 <=0.4 10e3/uL    Absolute NRBCs 0.0 10e3/uL   CBC with platelets differential     Status: Abnormal    Narrative    The following orders were created for panel order CBC with platelets differential.  Procedure                               Abnormality         Status                     ---------                               -----------         ------                     CBC with platelets and d...[[id number]]  Abnormal            Final result                 Please view results for these tests on the individual orders.     Patient's response to treatments and/or interventions: minimal releif    To be done/followed up on inpatient unit:  NPO monitor assess abdo and pain    Does this patient have any cognitive concerns?: none    Activity level - Baseline/Home:  Independent  Activity Level - Current:   Independent    Patient's Preferred language: English   Needed?: No    Isolation: None  Infection: Not Applicable  Patient tested for COVID 19 prior to admission: YES  Bariatric?: No    Vital Signs:   Vitals:    08/22/22 1956   BP: (!) 155/77   Resp: 18   Temp: 97.6  F (36.4  C)   TempSrc: Oral   SpO2:  94%   Weight: 63.5 kg (140 lb)   Height: 1.524 m (5')       Cardiac Rhythm:     Was the PSS-3 completed:   Yes  What interventions are required if any?               Family Comments: none  OBS brochure/video discussed/provided to patient/family: Yes              Name of person given brochure if not patient: na              Relationship to patient: nA    For the majority of the shift this patient's behavior was Green.   Behavioral interventions performed were NONE.    ED NURSE PHONE NUMBER: *[phone]

## 2022-08-23 NOTE — BRIEF OP NOTE
[facility]    Brief Operative Note    Pre-operative diagnosis: Internal hernia [K45.8]  Post-operative diagnosis Same as pre-operative diagnosis    Procedure: Procedure(s):  EXPLORATORY LAPAROTOMY, LYSIS OF ADHESIONS, REPAIR OF INTERNAL HERNIA AND PERISTOMAL HERNIA  Surgeon: Surgeon(s) and Role:     * Paola Flores MD - Primary  Anesthesia: General   Estimated Blood Loss: 30mL    Drains: None  Specimens:   ID Type Source Tests Collected by Time Destination   1 : hernia sac Tissue Hernia Sac SURGICAL PATHOLOGY EXAM Paola Flores MD 8/23/2022  1:57 AM      Findings:   Multiple dense intra-abdominal adhesions of small bowel to small bowel as well as small bowel to abdominal wall. Dilated small bowel loops with internal hernia, reduced with viable appearing bowel. Multiple seromyotomies repaired. Bowel run x3 to inspect for potential injuries. Small ~2cm parastomal defect closed primarily with suture. NGT placement confirmed intra-operatively..  Complications: None.  Implants: * No implants in log *    Condition on discharge from OR: Satisfactory    Suzy Mendez MD   [facility]   [phone].        ADDENDUM:    PATIENT DATA  Indicate Y or N:  Home O2 No  Hemodialysis  No  Transplant patient  No  Cirrhosis  No  Steroids in last 30 days  No  Immunomodulators in last 30 days  No  Anticoagulation at time of surgery  No   List medication NA  Prior abdominal surgery  Yes  Pelvic irradiation  No    Albumin within 30 days if known  3.4  Lab Results   Component Value Date    ALBUMIN 3.4 08/22/2022    ALBUMIN 3.0 07/16/2016        Hgb within 30 days if known 13.8  Hemoglobin   Date Value Ref Range Status   08/22/2022 13.8 11.7 - 15.7 g/dL Final   07/17/2016 13.5 11.7 - 15.7 g/dL Final   ]  Cr within 30 days if known 1.01  Creatinine   Date Value Ref Range Status   08/22/2022 1.01 0.52 - 1.04 mg/dL Final   07/17/2016 0.78 0.52 - 1.04 mg/dL Final   ]  Body  mass index is 27.34 kg/m .      OR DATA  Emergent  Yes   <24 hours  Yes   <1 week  No  Bowel Prep No  Antibiotics  No  DVT prophylaxis    Heparin  Yes   SCD  Yes   None  No  Drain  No  ASA (1,2,3,4) 3  OR time (min) 147  Stents  No  Transfuse >/= 2U  No  Anastomosis   Stapled NA   Handsewn NA  Leak Test    Positive  NA   Negative NA   Not done NA

## 2022-08-24 LAB
ANION GAP SERPL CALCULATED.3IONS-SCNC: 4 MMOL/L (ref 3–14)
BUN SERPL-MCNC: 19 MG/DL (ref 7–30)
CALCIUM SERPL-MCNC: 8.2 MG/DL (ref 8.5–10.1)
CHLORIDE BLD-SCNC: 111 MMOL/L (ref 94–109)
CO2 SERPL-SCNC: 28 MMOL/L (ref 20–32)
CREAT SERPL-MCNC: 0.78 MG/DL (ref 0.52–1.04)
ERYTHROCYTE [DISTWIDTH] IN BLOOD BY AUTOMATED COUNT: 13.8 % (ref 10–15)
GFR SERPL CREATININE-BSD FRML MDRD: 76 ML/MIN/1.73M2
GLUCOSE BLD-MCNC: 112 MG/DL (ref 70–99)
GLUCOSE BLDC GLUCOMTR-MCNC: 104 MG/DL (ref 70–99)
GLUCOSE BLDC GLUCOMTR-MCNC: 111 MG/DL (ref 70–99)
GLUCOSE BLDC GLUCOMTR-MCNC: 119 MG/DL (ref 70–99)
GLUCOSE BLDC GLUCOMTR-MCNC: 120 MG/DL (ref 70–99)
HCT VFR BLD AUTO: 35.5 % (ref 35–47)
HGB BLD-MCNC: 11.6 G/DL (ref 11.7–15.7)
MAGNESIUM SERPL-MCNC: 2.3 MG/DL (ref 1.6–2.3)
MCH RBC QN AUTO: 29 PG (ref 26.5–33)
MCHC RBC AUTO-ENTMCNC: 32.7 G/DL (ref 31.5–36.5)
MCV RBC AUTO: 89 FL (ref 78–100)
PATH REPORT.COMMENTS IMP SPEC: NORMAL
PATH REPORT.COMMENTS IMP SPEC: NORMAL
PATH REPORT.FINAL DX SPEC: NORMAL
PATH REPORT.GROSS SPEC: NORMAL
PATH REPORT.MICROSCOPIC SPEC OTHER STN: NORMAL
PATH REPORT.RELEVANT HX SPEC: NORMAL
PHOSPHATE SERPL-MCNC: 1.9 MG/DL (ref 2.5–4.5)
PHOSPHATE SERPL-MCNC: 2.5 MG/DL (ref 2.5–4.5)
PHOTO IMAGE: NORMAL
PLATELET # BLD AUTO: 208 10E3/UL (ref 150–450)
POTASSIUM BLD-SCNC: 3.6 MMOL/L (ref 3.4–5.3)
RBC # BLD AUTO: 4 10E6/UL (ref 3.8–5.2)
SODIUM SERPL-SCNC: 143 MMOL/L (ref 133–144)
WBC # BLD AUTO: 12.4 10E3/UL (ref 4–11)

## 2022-08-24 PROCEDURE — 258N000003 HC RX IP 258 OP 636: Performed by: NURSE PRACTITIONER

## 2022-08-24 PROCEDURE — 120N000001 HC R&B MED SURG/OB

## 2022-08-24 PROCEDURE — 88302 TISSUE EXAM BY PATHOLOGIST: CPT | Mod: 26 | Performed by: PATHOLOGY

## 2022-08-24 PROCEDURE — 84100 ASSAY OF PHOSPHORUS: CPT | Performed by: INTERNAL MEDICINE

## 2022-08-24 PROCEDURE — 36415 COLL VENOUS BLD VENIPUNCTURE: CPT | Performed by: INTERNAL MEDICINE

## 2022-08-24 PROCEDURE — 250N000009 HC RX 250: Performed by: INTERNAL MEDICINE

## 2022-08-24 PROCEDURE — 258N000003 HC RX IP 258 OP 636: Performed by: INTERNAL MEDICINE

## 2022-08-24 PROCEDURE — 82310 ASSAY OF CALCIUM: CPT | Performed by: STUDENT IN AN ORGANIZED HEALTH CARE EDUCATION/TRAINING PROGRAM

## 2022-08-24 PROCEDURE — 85027 COMPLETE CBC AUTOMATED: CPT | Performed by: STUDENT IN AN ORGANIZED HEALTH CARE EDUCATION/TRAINING PROGRAM

## 2022-08-24 PROCEDURE — 82374 ASSAY BLOOD CARBON DIOXIDE: CPT | Performed by: STUDENT IN AN ORGANIZED HEALTH CARE EDUCATION/TRAINING PROGRAM

## 2022-08-24 PROCEDURE — 250N000011 HC RX IP 250 OP 636: Performed by: COLON & RECTAL SURGERY

## 2022-08-24 PROCEDURE — 36415 COLL VENOUS BLD VENIPUNCTURE: CPT | Performed by: STUDENT IN AN ORGANIZED HEALTH CARE EDUCATION/TRAINING PROGRAM

## 2022-08-24 PROCEDURE — 99232 SBSQ HOSP IP/OBS MODERATE 35: CPT | Mod: FS | Performed by: INTERNAL MEDICINE

## 2022-08-24 PROCEDURE — 250N000013 HC RX MED GY IP 250 OP 250 PS 637: Performed by: COLON & RECTAL SURGERY

## 2022-08-24 PROCEDURE — 83735 ASSAY OF MAGNESIUM: CPT | Performed by: STUDENT IN AN ORGANIZED HEALTH CARE EDUCATION/TRAINING PROGRAM

## 2022-08-24 PROCEDURE — 99207 PR APP CREDIT; MD BILLING SHARED VISIT: CPT | Performed by: STUDENT IN AN ORGANIZED HEALTH CARE EDUCATION/TRAINING PROGRAM

## 2022-08-24 PROCEDURE — 84100 ASSAY OF PHOSPHORUS: CPT | Performed by: COLON & RECTAL SURGERY

## 2022-08-24 RX ADMIN — HEPARIN SODIUM 5000 UNITS: 5000 INJECTION, SOLUTION INTRAVENOUS; SUBCUTANEOUS at 06:50

## 2022-08-24 RX ADMIN — SODIUM PHOSPHATE, MONOBASIC, MONOHYDRATE 15 MMOL: 276; 142 INJECTION, SOLUTION INTRAVENOUS at 11:45

## 2022-08-24 RX ADMIN — METRONIDAZOLE 500 MG: 500 INJECTION, SOLUTION INTRAVENOUS at 02:12

## 2022-08-24 RX ADMIN — SODIUM CHLORIDE, POTASSIUM CHLORIDE, SODIUM LACTATE AND CALCIUM CHLORIDE: 600; 310; 30; 20 INJECTION, SOLUTION INTRAVENOUS at 15:49

## 2022-08-24 RX ADMIN — SODIUM CHLORIDE, POTASSIUM CHLORIDE, SODIUM LACTATE AND CALCIUM CHLORIDE: 600; 310; 30; 20 INJECTION, SOLUTION INTRAVENOUS at 15:45

## 2022-08-24 RX ADMIN — BENZOCAINE 6 MG-MENTHOL 10 MG LOZENGES 1 LOZENGE: at 10:13

## 2022-08-24 RX ADMIN — HEPARIN SODIUM 5000 UNITS: 5000 INJECTION, SOLUTION INTRAVENOUS; SUBCUTANEOUS at 15:45

## 2022-08-24 ASSESSMENT — ACTIVITIES OF DAILY LIVING (ADL)
ADLS_ACUITY_SCORE: 26
ADLS_ACUITY_SCORE: 26
ADLS_ACUITY_SCORE: 27
ADLS_ACUITY_SCORE: 26
ADLS_ACUITY_SCORE: 27
ADLS_ACUITY_SCORE: 26
ADLS_ACUITY_SCORE: 26
ADLS_ACUITY_SCORE: 27

## 2022-08-24 NOTE — PROGRESS NOTES
COLON & RECTAL SURGERY  PROGRESS NOTE    August 24, 2022  Post-op Day # 1    SUBJECTIVE:  Abdominal pain stable, intermittent sharp pains in left side. No NGT output recorded yesterday. Now passing gas and liquid stool from stoma. Walked twice yesterday. AVSS. WBC 12.4 from 19.1. Hgb 11.6 from 13.1. Phos 1.9.    OBJECTIVE:  Temp:  [97.7  F (36.5  C)-98.4  F (36.9  C)] 97.9  F (36.6  C)  Pulse:  [71-89] 71  Resp:  [16] 16  BP: (113-143)/(64-76) 141/64  SpO2:  [92 %-97 %] 92 %    Intake/Output Summary (Last 24 hours) at 8/24/2022 0859  Last data filed at 8/24/2022 0611  Gross per 24 hour   Intake --   Output 550 ml   Net -550 ml       GENERAL:  Awake, alert, no acute distress  HEAD: Normocephalic atraumatic  SCLERA: Anicteric  EXTREMITIES: Warm and well perfused  ABDOMEN:  Soft, appropriately tender, non-distended. No guarding, rigidity, or peritoneal signs. Stoma pink with gas and liquid stool in bag. NGT with ~500 ml dark brown output in cannister.   INCISION:  C/d/i, two staples not fully across incision, steri strips in place, serosang drainage.    LABS:  Lab Results   Component Value Date    WBC 12.4 08/24/2022    WBC 5.7 07/17/2016     Lab Results   Component Value Date    HGB 11.6 08/24/2022    HGB 13.5 07/17/2016     Lab Results   Component Value Date    HCT 35.5 08/24/2022    HCT 38.9 07/17/2016     Lab Results   Component Value Date     08/24/2022     07/17/2016     Last Basic Metabolic Panel:  Lab Results   Component Value Date     08/24/2022     07/17/2016      Lab Results   Component Value Date    POTASSIUM 3.6 08/24/2022    POTASSIUM 3.4 07/17/2016     Lab Results   Component Value Date    CHLORIDE 111 08/24/2022    CHLORIDE 106 07/17/2016     Lab Results   Component Value Date    KATLYN 8.3 08/23/2022    KATLYN 8.5 07/17/2016     Lab Results   Component Value Date    CO2 24 08/23/2022    CO2 26 07/17/2016     Lab Results   Component Value Date    BUN 22 08/23/2022    BUN 10  07/17/2016     Lab Results   Component Value Date    CR 0.83 08/23/2022    CR 0.78 07/17/2016     Lab Results   Component Value Date     08/24/2022     08/23/2022    GLC 86 07/17/2016       ASSESSMENT/PLAN: 81 F with h/o Crohn's who presented with closed loop SBO, now s/p exploratory laparotomy, lysis of adhesions, repair of internal hernia and parastomal hernia. AVSS.     1. NPO, continue NGT to LIS. Monitor/record NGT output. Will see how much comes out in the next few hours, if low output will do clamping trial later today.   2. PRN pain meds  3. Continue IVF. If unable to remove NGT today will need to start TPN. Replace phos.  4. OOB, ambulate  5. Dressing changes to midline incision daily and PRN  6. SQH for ppx    Discussed with Dr. Flores.    For questions/paging, please contact the CRS office at [phone].    Darrell Hong PA-C  Colorectal Physician Assistant    [facility]  [address]  [place] [address]  T: [phone]  F: [phone]      [facility] Staff  I performed a history and physical examination of the patient and discussed their management with the physician assistant. I reviewed the physician assistants note and agree with the documented findings and plan of care.     Overall doing well. Complains of L>R abdominal pain.  + flatus and stool from stoma.  NGt with no recorded output but there is at least 500 mL of dark output in canister.    Alert, NAD  Soft, ND, appropriately tender per midline    Plan:   Monitor NGt output this am, if low will do NGT clamp trial this afternoon   Cont IVF, may need to consider starting TPN  oob ambulation  SQH    Gosia Flores MD, FACS    [facility]  [address]  [place] [address]  T: [phone]  F: [phone]

## 2022-08-24 NOTE — PROGRESS NOTES
[facility]    Medicine Progress Note - Hospitalist Service    Date of Admission:  8/22/2022    Assessment & Plan  Merlene River is a 81 year old female admitted on 8/22/2022 after presenting to the emergency department for evaluation of abdominal pain, nausea and vomiting, and was diagnosed with a closed-loop small bowel obstruction as well as a possible secondary obstruction at parastomal hernia.      # Closed loop small bowel obstruction   # S/p ex lap, GAVIN, repair of internal and peristomal hernias on 8/23/22 w/ CRS  # Crohn's Disease s/p proctocolectomy with end ileostomy in 2009  Pt presented with abdominal pain and reduced ileostomy output. Leukocytosis to 20.8 thousand at admit. CT in the ER showed a closed loop obstruction and findings suspicious for internal hernia, possible parastomal hernia. Colorectal surgery consulted and pt taken for emergency laparotomy with lysis of adhesions and hernia repairs 8/23. IM was consulted for post operative medical management. WBC down to 12.4 today. Abdomen remains soft and incision site appears to be healing appropriately.   - N.p.o, NGT as per CRS: possible clamping trial later today if output slows   - Per CRS, if unable to remove NGT today will need to start TPN   - IVF at 100/h; can consider addition of dextrose pending blood glucose trend  - Acetaminophen, low-dose oxycodone, low-dose IV Dilaudid available for pain control.  - Ostomy management as per colorectal surgery   - Encourage ambulation   - Daily CBC   - Not on treatment for her Crohn's disease     # Cough   Pt reported for several weeks PTA. CXR on 8/23 post operatively showed some atelectasis but not definite infiltrate/ COVID Negative. Lung are clear on exam today    - Pulmonary toilet with IS and acapella post-operatively   - Encourage ambulation as able     # Hypomagnesemia & Hypermagnesemia   # Hyperchloremia - secondary to 0.9% saline  # Hypokalemia - Resolved   K 3.3 on  admission, improved with repletion.Mag 1.5 on admit, given repletion with Mag no elevated at 2.8 yesterday follow replacement. Phosphorus level normal.   -Potassium, magnesium, and phosphorus replacement protocols in place    - goal is balanced electrolytes to minimize risk of ileus  - Daily BMP, mag and phos for now  - Change IV fluids to LR     # Hyperglycemia   Up to 204. A1C normal at 5.3%/  - Continue Medium dose sliding scale insulin as needed     # Hypothyroidism   - PTA Porcine thyroid 32.5 mg daily once tolerating PO    # Risk for delirium  At risk  given age, recent anesthetic exposure, likely poor sleep  - maximize delirium preventive measures including preservation of usual sleep/wake cycle, provision of sensory aids, early mobility, bowel/bladder function, pain control, frequent reorientation, avoid deliriogenic meds, etc  - discontinued benadryl         Diet: NPO for Medical/Clinical Reasons Except for: Meds, Ice Chips    DVT Prophylaxis: Heparin SQ and Pneumatic Compression Devices  [place] Catheter: Not present  Central Lines: None  Cardiac Monitoring: None  Code Status: Full Code      Disposition Plan      Expected Discharge Date: 08/29/2022                The patient's care was discussed with the Attending Physician, Dr. Mancera and Patient.    Ton Mercado PA-C  Hospitalist Service  [facility]  Securely message with the Vocera Web Console (learn more here)  Text page via [employer] ([employer]) Paging/Directory         Clinically Significant Risk Factors Present on Admission               # Overweight: Estimated body mass index is 27.34 kg/m  as calculated from the following:    Height as of this encounter: 1.524 m (5').    Weight as of this encounter: 63.5 kg (140 lb).        ______________________________________________________________________    Interval History   Pt reports feeling better today compared to prior to surgery. Pain is currently well controlled. She says the NGT is  irritating the back of her throat. Cough is mild, not bringing up significant phlegm. Trying to use IS but this causes increased pressure and pain in her abdomen. Denies fevers, chills, or sweats.     Data reviewed today: I reviewed all medications, new labs and imaging results over the last 24 hours. Please see discussion of these results in the A/P.    Physical Exam     Vital Signs: Temp: 97.9  F (36.6  C) Temp src: Oral BP: 113/65 Pulse: 77     Resp: 16 SpO2: 94 % O2 Device: Nasal cannula Oxygen Delivery: 2 LPM  Weight: 140 lbs 0 oz    Constitutional: awake, alert, cooperative, in no acute distress. A&Ox3.   Eyes: EOM, PERRLA. Sclera clear, conjunctiva normal. Anicteric   ENT: Normocephalic, without obvious abnormality, atraumatic.   Respiratory: No increased work of breathing. Clear to auscultation bilaterally, no crackles or wheezing  Cardiovascular: RRR. No murmur or friction rub. No edema. No chest wall tenderness.  GI: Anticipated post-surgical tenderness, centralized to the Left side. hypoactive bowel sounds, soft, nondisteded, midline incision bandaged with some serosanguinous, non-purulent discharge.  RLQ stoma beefy red, moderate amount of bilious output in ostomy bag. NGT in placed and suctioning brown output.  Skin: no bruising or bleeding. Normal skin color, No jaundice  Musculoskeletal:  Full range of motion noted.    Neurologic: Cranial nerves II-XII are grossly intact.   Neuropsychiatric: General: normal, calm and normal eye contact. Normal affect          Data   Recent Labs   Lab 08/24/22  0152 08/23/22  2213 08/23/22  1810 08/23/22  1720 08/23/22  1104 08/23/22  0812 08/23/22  0500 08/22/22  2200 08/22/22 2000   WBC  --   --   --   --   --  19.1*  --   --  20.8*   HGB  --   --   --   --   --  13.1  --   --  13.8   MCV  --   --   --   --   --  85  --   --  87   PLT  --   --   --   --   --  241  --   --  243   INR  --   --   --   --   --   --   --  1.00  --    NA  --   --   --   --   --  142  --    --  140   POTASSIUM  --   --   --  3.7  --  3.8  --   --  3.3*   CHLORIDE  --   --   --   --   --  112*  --   --  110*   CO2  --   --   --   --   --  24  --   --  23   BUN  --   --   --   --   --  22  --   --  22   CR  --   --   --   --   --  0.83  --   --  1.01   ANIONGAP  --   --   --   --   --  6  --   --  7   KATLYN  --   --   --   --   --  8.3*  --   --  8.8   * 115* 136*  --    < > 153*   < >  --  204*   ALBUMIN  --   --   --   --   --  2.7*  --   --  3.4   PROTTOTAL  --   --   --   --   --  5.6*  --   --  7.0   BILITOTAL  --   --   --   --   --  0.7  --   --  0.6   ALKPHOS  --   --   --   --   --  68  --   --  92   ALT  --   --   --   --   --  23  --   --  28   AST  --   --   --   --   --  20  --   --  20   LIPASE  --   --   --   --   --   --   --   --  113    < > = values in this interval not displayed.       Recent Results (from the past 24 hour(s))   XR Chest 2 Views    Narrative    XR CHEST 2 VIEWS 8/23/2022 8:55 AM    HISTORY: brian, N/V    COMPARISON: 5/8/2009      Impression    IMPRESSION: NG tube coursing below the left hemidiaphragm, tip outside  the field of view. Stable linear scarring in the right midlung.  Increased perihilar linear opacities and linear opacities in the left  midlung, likely due to atelectasis. No focal infiltrate, pleural  effusion, significant pulmonary edema or pneumothorax. Normal heart  size. Vertebroplasty changes in the midthoracic spine. Multiple wedge  compression deformities of lower thoracic and upper lumbar vertebral  bodies.    CHRISTIANO MEADOWS MD         SYSTEM ID:  [id number]

## 2022-08-24 NOTE — PLAN OF CARE
Goal Outcome Evaluation:           POD 1 from a hernia repair. A&O. CMS intact. Bowel sounds +x4, - flatus, tolerating ice chips. NG to low intermittent suction - minimal output. VSS on 2L O2, 4L while ambulating. Small amount of drainage on dressing. Ileostomy small brown output.  Up with SBA, GB. IV dilaudid given for pain- complaining of intermittent left side sharp pain. Voiding adequately. Fluids infusing.

## 2022-08-25 LAB
ANION GAP SERPL CALCULATED.3IONS-SCNC: 4 MMOL/L (ref 3–14)
BUN SERPL-MCNC: 16 MG/DL (ref 7–30)
CALCIUM SERPL-MCNC: 8.6 MG/DL (ref 8.5–10.1)
CHLORIDE BLD-SCNC: 108 MMOL/L (ref 94–109)
CO2 SERPL-SCNC: 28 MMOL/L (ref 20–32)
CREAT SERPL-MCNC: 0.68 MG/DL (ref 0.52–1.04)
ERYTHROCYTE [DISTWIDTH] IN BLOOD BY AUTOMATED COUNT: 13.4 % (ref 10–15)
GFR SERPL CREATININE-BSD FRML MDRD: 88 ML/MIN/1.73M2
GLUCOSE BLD-MCNC: 118 MG/DL (ref 70–99)
GLUCOSE BLDC GLUCOMTR-MCNC: 119 MG/DL (ref 70–99)
GLUCOSE BLDC GLUCOMTR-MCNC: 85 MG/DL (ref 70–99)
GLUCOSE BLDC GLUCOMTR-MCNC: 98 MG/DL (ref 70–99)
HCT VFR BLD AUTO: 35 % (ref 35–47)
HGB BLD-MCNC: 11.7 G/DL (ref 11.7–15.7)
MAGNESIUM SERPL-MCNC: 1.8 MG/DL (ref 1.6–2.3)
MCH RBC QN AUTO: 28.9 PG (ref 26.5–33)
MCHC RBC AUTO-ENTMCNC: 33.4 G/DL (ref 31.5–36.5)
MCV RBC AUTO: 86 FL (ref 78–100)
PHOSPHATE SERPL-MCNC: 1.4 MG/DL (ref 2.5–4.5)
PLATELET # BLD AUTO: 200 10E3/UL (ref 150–450)
POTASSIUM BLD-SCNC: 3.2 MMOL/L (ref 3.4–5.3)
POTASSIUM BLD-SCNC: 3.3 MMOL/L (ref 3.4–5.3)
RBC # BLD AUTO: 4.05 10E6/UL (ref 3.8–5.2)
SODIUM SERPL-SCNC: 140 MMOL/L (ref 133–144)
WBC # BLD AUTO: 9.7 10E3/UL (ref 4–11)

## 2022-08-25 PROCEDURE — 36415 COLL VENOUS BLD VENIPUNCTURE: CPT | Performed by: INTERNAL MEDICINE

## 2022-08-25 PROCEDURE — 84100 ASSAY OF PHOSPHORUS: CPT | Performed by: STUDENT IN AN ORGANIZED HEALTH CARE EDUCATION/TRAINING PROGRAM

## 2022-08-25 PROCEDURE — 85027 COMPLETE CBC AUTOMATED: CPT | Performed by: STUDENT IN AN ORGANIZED HEALTH CARE EDUCATION/TRAINING PROGRAM

## 2022-08-25 PROCEDURE — 258N000003 HC RX IP 258 OP 636: Performed by: INTERNAL MEDICINE

## 2022-08-25 PROCEDURE — 250N000013 HC RX MED GY IP 250 OP 250 PS 637: Performed by: COLON & RECTAL SURGERY

## 2022-08-25 PROCEDURE — 82310 ASSAY OF CALCIUM: CPT | Performed by: STUDENT IN AN ORGANIZED HEALTH CARE EDUCATION/TRAINING PROGRAM

## 2022-08-25 PROCEDURE — 99232 SBSQ HOSP IP/OBS MODERATE 35: CPT | Performed by: INTERNAL MEDICINE

## 2022-08-25 PROCEDURE — 84100 ASSAY OF PHOSPHORUS: CPT | Performed by: INTERNAL MEDICINE

## 2022-08-25 PROCEDURE — 84132 ASSAY OF SERUM POTASSIUM: CPT | Performed by: COLON & RECTAL SURGERY

## 2022-08-25 PROCEDURE — 83735 ASSAY OF MAGNESIUM: CPT | Performed by: STUDENT IN AN ORGANIZED HEALTH CARE EDUCATION/TRAINING PROGRAM

## 2022-08-25 PROCEDURE — 258N000003 HC RX IP 258 OP 636: Performed by: NURSE PRACTITIONER

## 2022-08-25 PROCEDURE — 250N000009 HC RX 250: Performed by: INTERNAL MEDICINE

## 2022-08-25 PROCEDURE — 250N000013 HC RX MED GY IP 250 OP 250 PS 637: Performed by: INTERNAL MEDICINE

## 2022-08-25 PROCEDURE — 120N000001 HC R&B MED SURG/OB

## 2022-08-25 PROCEDURE — 36415 COLL VENOUS BLD VENIPUNCTURE: CPT | Performed by: COLON & RECTAL SURGERY

## 2022-08-25 PROCEDURE — 36415 COLL VENOUS BLD VENIPUNCTURE: CPT | Performed by: STUDENT IN AN ORGANIZED HEALTH CARE EDUCATION/TRAINING PROGRAM

## 2022-08-25 PROCEDURE — 250N000011 HC RX IP 250 OP 636: Performed by: COLON & RECTAL SURGERY

## 2022-08-25 RX ORDER — POTASSIUM CHLORIDE 1500 MG/1
40 TABLET, EXTENDED RELEASE ORAL ONCE
Status: COMPLETED | OUTPATIENT
Start: 2022-08-25 | End: 2022-08-25

## 2022-08-25 RX ORDER — SIMETHICONE 80 MG
80 TABLET,CHEWABLE ORAL EVERY 6 HOURS PRN
Status: DISCONTINUED | OUTPATIENT
Start: 2022-08-25 | End: 2022-08-27 | Stop reason: HOSPADM

## 2022-08-25 RX ADMIN — SODIUM CHLORIDE, POTASSIUM CHLORIDE, SODIUM LACTATE AND CALCIUM CHLORIDE: 600; 310; 30; 20 INJECTION, SOLUTION INTRAVENOUS at 00:33

## 2022-08-25 RX ADMIN — HEPARIN SODIUM 5000 UNITS: 5000 INJECTION, SOLUTION INTRAVENOUS; SUBCUTANEOUS at 16:38

## 2022-08-25 RX ADMIN — SODIUM PHOSPHATE, MONOBASIC, MONOHYDRATE 15 MMOL: 276; 142 INJECTION, SOLUTION INTRAVENOUS at 16:41

## 2022-08-25 RX ADMIN — SIMETHICONE 80 MG: 80 TABLET, CHEWABLE ORAL at 20:21

## 2022-08-25 RX ADMIN — POTASSIUM CHLORIDE 40 MEQ: 1500 TABLET, EXTENDED RELEASE ORAL at 16:38

## 2022-08-25 RX ADMIN — HEPARIN SODIUM 5000 UNITS: 5000 INJECTION, SOLUTION INTRAVENOUS; SUBCUTANEOUS at 08:33

## 2022-08-25 RX ADMIN — SODIUM CHLORIDE, POTASSIUM CHLORIDE, SODIUM LACTATE AND CALCIUM CHLORIDE: 600; 310; 30; 20 INJECTION, SOLUTION INTRAVENOUS at 13:14

## 2022-08-25 RX ADMIN — SIMETHICONE 80 MG: 80 TABLET, CHEWABLE ORAL at 14:39

## 2022-08-25 RX ADMIN — HEPARIN SODIUM 5000 UNITS: 5000 INJECTION, SOLUTION INTRAVENOUS; SUBCUTANEOUS at 00:24

## 2022-08-25 ASSESSMENT — ACTIVITIES OF DAILY LIVING (ADL)
ADLS_ACUITY_SCORE: 26

## 2022-08-25 NOTE — PROGRESS NOTES
MD Notification    Notified Person: MD Mancera    Notified Person Name:    Notification Date/Time: 8/25 1405    Notification Interaction: A.B. 2222 pt requesting medication for gas pain    Purpose of Notification:    Orders Received:    Comments:

## 2022-08-25 NOTE — PLAN OF CARE
Problem: POD 2 expl lap with lysis of adhesions and hernia repair  Vitals: VSS on RA  Orientation/Neuro: A/Ox4  Activity Level: SBA, ambulated twice in hallway  Diet: tolerating clears, fair appetite, and will advance to fulls at 1600  GI: c/o gas discomfort, ileostomy with adequate loose stools  : Voiding adequately/spontaneously  Labs: BG checks, no insulin given  IV Fluids/Drains/Tubes: PIV infusing 100ml/hr LR  Incisions/Dressings: Pt showered, changed midline incision dressing  Pain Management: Mild abd pain managed with warm packs and PRN Simethicone. Denied further intervention  Plan: Continue to monitor

## 2022-08-25 NOTE — PLAN OF CARE
VSS on 2 liters oxygen via nasal cannula.  Using acapella.  Voiding in bathroom.   Passed NG clamping trial today.  NG tube removed at 1800.  Diet advanced and tolerating clear liquids.  Liquid brown stool in ileostomy.  Midline staples reinforced with steri strips.  Midline dressing with serous drainage, changed x1.  Ambulating in halls with A1, walker and 2 liters oxygen via nasal cannula.  Denied pain.  Daughters at bedside and supportive of pt.  Discharge plan pending progress.

## 2022-08-25 NOTE — PROGRESS NOTES
[facility]    Medicine Progress Note - Hospitalist Service    Date of Admission:  8/22/2022    Assessment & Plan            Merlene River is a 81 year old female admitted on 8/22/2022 after presenting to the emergency department for evaluation of abdominal pain, nausea and vomiting, and was diagnosed with a closed-loop small bowel obstruction as well as a possible secondary obstruction at parastomal hernia.      # Closed loop small bowel obstruction   # S/p ex lap, GAVIN, repair of internal and peristomal hernias on 8/23/22   # Crohn's Disease s/p proctocolectomy with end ileostomy in 2009  Pt presented with abdominal pain and reduced ileostomy output. Leukocytosis to 20.8 thousand at admit. CT in the ER showed a closed loop obstruction and findings suspicious for internal hernia, possible parastomal hernia. Colorectal surgery consulted and pt taken for emergency laparotomy with lysis of adhesions and hernia repairs 8/23. IM was consulted for post operative medical management. WBC down to 12.4 today. Abdomen remains soft and incision site appears to be healing appropriately.     - diet being advanced from clears to full liquid diet today  - prn analgesics, she is not using anything  - prn simethicone added  - routine postop cares per surgery  - discontinue IVF today     # Cough   Pt reported for several weeks PTA. CXR on 8/23 post operatively showed some atelectasis but not definite infiltrate/ COVID Negative. Lung are clear  - Pulmonary toilet with IS and acapella post-operatively   - supportive care  - Encourage ambulation as able     # Hypomagnesemia & Hypermagnesemia- resolved  # Hyperchloremia - secondary to 0.9% saline- resolved  # Hypokalemia  # hypophosphatemia   K 3.3 on admission, improved with repletion.Mag 1.5 on admit, given repletion with Mag no elevated at 2.8, now has normalized  - replace K, phos, magnesium per protocol     # Hyperglycemia   Up to 204. A1C normal at 5.3%/  -  BG <140, not needing insulin. Discontinue sliding scale insulin     # Hypothyroidism   - PTA Porcine thyroid 32.5 mg daily once diet advanced     # Risk for delirium  At risk  given age, recent anesthetic exposure, likely poor sleep  - maximize delirium preventive measures including preservation of usual sleep/wake cycle, provision of sensory aids, early mobility, bowel/bladder function, pain control, frequent reorientation, avoid deliriogenic meds, etc  - discontinued benadryl  - no signs of delirium at this time       Diet: Full Liquid Diet    DVT Prophylaxis: Heparin SQ  [place] Catheter: Not present  Central Lines: None  Cardiac Monitoring: None  Code Status: Full Code      Disposition Plan      Expected Discharge Date: 08/27/2022                The patient's care was discussed with the Bedside Nurse and Patient.    Cynthia Mancera MD  Hospitalist Service  [facility]  Securely message with the Vocera Web Console (learn more here)  Text page via [employer] Paging/Directory         Clinically Significant Risk Factors Present on Admission               # Overweight: Estimated body mass index is 27.34 kg/m  as calculated from the following:    Height as of this encounter: 1.524 m (5').    Weight as of this encounter: 63.5 kg (140 lb).        ______________________________________________________________________    Interval History   C/o some gas pain. Denies n/v. Not using anything for pain, states she does not want to. States her pain is controlled. Tolerating clears, advancing to full liquid diet today     Data reviewed today: I reviewed all medications, new labs and imaging results over the last 24 hours. I personally reviewed no images or EKG's today.    Physical Exam   Vital Signs: Temp: 98.3  F (36.8  C) Temp src: Oral BP: 136/65 Pulse: 80   Resp: 16 SpO2: 91 % O2 Device: None (Room air)   Weight: 140 lbs 0 oz  General Appearance: Alert, awake and no apparent distress  Respiratory:  clear to ausculation bilaterally, no wheezing  Cardiovascular: regular rate and rhythm  GI: soft, ostomy bag with gas present init  Skin: warm and dry      Data   Recent Labs   Lab 08/25/22  1215 08/25/22  0809 08/25/22  0758 08/24/22  1115 08/24/22  0804 08/23/22  1810 08/23/22  1720 08/23/22  1104 08/23/22  0812 08/23/22  0500 08/22/22  2200 08/22/22 2000   WBC  --   --  9.7  --  12.4*  --   --   --  19.1*  --   --  20.8*   HGB  --   --  11.7  --  11.6*  --   --   --  13.1  --   --  13.8   MCV  --   --  86  --  89  --   --   --  85  --   --  87   PLT  --   --  200  --  208  --   --   --  241  --   --  243   INR  --   --   --   --   --   --   --   --   --   --  1.00  --    NA  --   --  140  --  143  --   --   --  142  --   --  140   POTASSIUM  --   --  3.2*  --  3.6  --  3.7  --  3.8  --   --  3.3*   CHLORIDE  --   --  108  --  111*  --   --   --  112*  --   --  110*   CO2  --   --  28  --  28  --   --   --  24  --   --  23   BUN  --   --  16  --  19  --   --   --  22  --   --  22   CR  --   --  0.68  --  0.78  --   --   --  0.83  --   --  1.01   ANIONGAP  --   --  4  --  4  --   --   --  6  --   --  7   KATLYN  --   --  8.6  --  8.2*  --   --   --  8.3*  --   --  8.8   GLC 85 119* 118*   < > 112*   < >  --    < > 153*   < >  --  204*   ALBUMIN  --   --   --   --   --   --   --   --  2.7*  --   --  3.4   PROTTOTAL  --   --   --   --   --   --   --   --  5.6*  --   --  7.0   BILITOTAL  --   --   --   --   --   --   --   --  0.7  --   --  0.6   ALKPHOS  --   --   --   --   --   --   --   --  68  --   --  92   ALT  --   --   --   --   --   --   --   --  23  --   --  28   AST  --   --   --   --   --   --   --   --  20  --   --  20   LIPASE  --   --   --   --   --   --   --   --   --   --   --  113    < > = values in this interval not displayed.     No results found for this or any previous visit (from the past 24 hour(s)).  Medications       heparin ANTICOAGULANT  5,000 Units Subcutaneous Q8H     insulin aspart  1-6  Units Subcutaneous TID w/meals     potassium chloride  40 mEq Oral Once     sodium chloride (PF)  3 mL Intracatheter Q8H     sodium chloride (PF)  3 mL Intracatheter Q8H     sodium phosphate  15 mmol Intravenous Once

## 2022-08-25 NOTE — PLAN OF CARE
Goal Outcome Evaluation:           VSS on 1 liters oxygen via nasal cannula.  Tried to wean off oxygen but went down to 89-91. Using acapella.  Voiding in bathroom.   .  Tolerating clears.  Liquid brown stool in ileostomy.  Midline staples reinforced with steri strips.  Midline dressing with old drainage.   Denied pain.    Discharge plan pending progress.

## 2022-08-26 LAB
ANION GAP SERPL CALCULATED.3IONS-SCNC: 3 MMOL/L (ref 3–14)
BUN SERPL-MCNC: 13 MG/DL (ref 7–30)
CALCIUM SERPL-MCNC: 8.7 MG/DL (ref 8.5–10.1)
CHLORIDE BLD-SCNC: 106 MMOL/L (ref 94–109)
CO2 SERPL-SCNC: 29 MMOL/L (ref 20–32)
CREAT SERPL-MCNC: 0.7 MG/DL (ref 0.52–1.04)
ERYTHROCYTE [DISTWIDTH] IN BLOOD BY AUTOMATED COUNT: 13.1 % (ref 10–15)
GFR SERPL CREATININE-BSD FRML MDRD: 87 ML/MIN/1.73M2
GLUCOSE BLD-MCNC: 102 MG/DL (ref 70–99)
GLUCOSE BLDC GLUCOMTR-MCNC: 90 MG/DL (ref 70–99)
HCT VFR BLD AUTO: 37.3 % (ref 35–47)
HGB BLD-MCNC: 12.6 G/DL (ref 11.7–15.7)
HOLD SPECIMEN: NORMAL
MAGNESIUM SERPL-MCNC: 1.6 MG/DL (ref 1.6–2.3)
MCH RBC QN AUTO: 29.1 PG (ref 26.5–33)
MCHC RBC AUTO-ENTMCNC: 33.8 G/DL (ref 31.5–36.5)
MCV RBC AUTO: 86 FL (ref 78–100)
PHOSPHATE SERPL-MCNC: 2.6 MG/DL (ref 2.5–4.5)
PHOSPHATE SERPL-MCNC: 2.7 MG/DL (ref 2.5–4.5)
PLATELET # BLD AUTO: 232 10E3/UL (ref 150–450)
POTASSIUM BLD-SCNC: 3.9 MMOL/L (ref 3.4–5.3)
RBC # BLD AUTO: 4.33 10E6/UL (ref 3.8–5.2)
SODIUM SERPL-SCNC: 138 MMOL/L (ref 133–144)
WBC # BLD AUTO: 8 10E3/UL (ref 4–11)

## 2022-08-26 PROCEDURE — 250N000013 HC RX MED GY IP 250 OP 250 PS 637: Performed by: PHYSICIAN ASSISTANT

## 2022-08-26 PROCEDURE — 82310 ASSAY OF CALCIUM: CPT | Performed by: STUDENT IN AN ORGANIZED HEALTH CARE EDUCATION/TRAINING PROGRAM

## 2022-08-26 PROCEDURE — 250N000013 HC RX MED GY IP 250 OP 250 PS 637: Performed by: INTERNAL MEDICINE

## 2022-08-26 PROCEDURE — 99232 SBSQ HOSP IP/OBS MODERATE 35: CPT | Performed by: INTERNAL MEDICINE

## 2022-08-26 PROCEDURE — 36415 COLL VENOUS BLD VENIPUNCTURE: CPT | Performed by: STUDENT IN AN ORGANIZED HEALTH CARE EDUCATION/TRAINING PROGRAM

## 2022-08-26 PROCEDURE — 83735 ASSAY OF MAGNESIUM: CPT | Performed by: STUDENT IN AN ORGANIZED HEALTH CARE EDUCATION/TRAINING PROGRAM

## 2022-08-26 PROCEDURE — 250N000011 HC RX IP 250 OP 636: Performed by: COLON & RECTAL SURGERY

## 2022-08-26 PROCEDURE — 85027 COMPLETE CBC AUTOMATED: CPT | Performed by: STUDENT IN AN ORGANIZED HEALTH CARE EDUCATION/TRAINING PROGRAM

## 2022-08-26 PROCEDURE — 120N000001 HC R&B MED SURG/OB

## 2022-08-26 PROCEDURE — 84100 ASSAY OF PHOSPHORUS: CPT | Performed by: COLON & RECTAL SURGERY

## 2022-08-26 RX ORDER — CALCIUM CARBONATE 500 MG/1
500 TABLET, CHEWABLE ORAL DAILY PRN
Status: DISCONTINUED | OUTPATIENT
Start: 2022-08-26 | End: 2022-08-27 | Stop reason: HOSPADM

## 2022-08-26 RX ORDER — POTASSIUM CHLORIDE 1500 MG/1
40 TABLET, EXTENDED RELEASE ORAL ONCE
Status: COMPLETED | OUTPATIENT
Start: 2022-08-26 | End: 2022-08-26

## 2022-08-26 RX ADMIN — ACETAMINOPHEN 650 MG: 325 TABLET ORAL at 18:15

## 2022-08-26 RX ADMIN — OXYCODONE HYDROCHLORIDE 2.5 MG: 5 TABLET ORAL at 18:15

## 2022-08-26 RX ADMIN — ACETAMINOPHEN 650 MG: 325 TABLET ORAL at 11:48

## 2022-08-26 RX ADMIN — HEPARIN SODIUM 5000 UNITS: 5000 INJECTION, SOLUTION INTRAVENOUS; SUBCUTANEOUS at 08:16

## 2022-08-26 RX ADMIN — HEPARIN SODIUM 5000 UNITS: 5000 INJECTION, SOLUTION INTRAVENOUS; SUBCUTANEOUS at 16:24

## 2022-08-26 RX ADMIN — HEPARIN SODIUM 5000 UNITS: 5000 INJECTION, SOLUTION INTRAVENOUS; SUBCUTANEOUS at 01:00

## 2022-08-26 RX ADMIN — SIMETHICONE 80 MG: 80 TABLET, CHEWABLE ORAL at 19:30

## 2022-08-26 RX ADMIN — CALCIUM CARBONATE (ANTACID) CHEW TAB 500 MG 500 MG: 500 CHEW TAB at 19:41

## 2022-08-26 RX ADMIN — POTASSIUM CHLORIDE 40 MEQ: 1500 TABLET, EXTENDED RELEASE ORAL at 00:57

## 2022-08-26 RX ADMIN — OXYCODONE HYDROCHLORIDE 2.5 MG: 5 TABLET ORAL at 11:48

## 2022-08-26 RX ADMIN — SIMETHICONE 80 MG: 80 TABLET, CHEWABLE ORAL at 08:16

## 2022-08-26 ASSESSMENT — ACTIVITIES OF DAILY LIVING (ADL)
ADLS_ACUITY_SCORE: 23
ADLS_ACUITY_SCORE: 24
ADLS_ACUITY_SCORE: 23
ADLS_ACUITY_SCORE: 24
ADLS_ACUITY_SCORE: 24
ADLS_ACUITY_SCORE: 26

## 2022-08-26 NOTE — PROGRESS NOTES
Pt A&O x4. Ambulated 3x in the halls independently with a walker. Independent to ostomy cares, with output of gas and stool throughout the shift. Diet advanced to low-fiber, tolerated. Denies N&V. BS active x4. Pt reported increased localized pain and increased abdominal distention in the abdomen rating 9/10. Offered heat and abdominal binder with minimal improvement. Pt Initially denied pain meds, but after consult with physician agreed to take prescribed oxy and tylenol. Pt resting comfortably since.

## 2022-08-26 NOTE — PLAN OF CARE
[id number]    POD 2-3.  AOx4.  VSS on RA  SBA.  Full liquid diet.  Ileostomy self managed, leaves output in the bathroom.  Voiding adequately.  Fluids discontinued.  Midline incision dressing CDI. Denies pain, but feels gasous discomfort.  KAnastasiia, Mg RN managed.  K Phos replaced. Discharge pending.

## 2022-08-26 NOTE — PROGRESS NOTES
[facility]    Medicine Progress Note - Hospitalist Service    Date of Admission:  8/22/2022    Assessment & Plan            Merlene River is a 81 year old female admitted on 8/22/2022 after presenting to the emergency department for evaluation of abdominal pain, nausea and vomiting, and was diagnosed with a closed-loop small bowel obstruction as well as a possible secondary obstruction at parastomal hernia.      # Closed loop small bowel obstruction   # S/p ex lap, GAVIN, repair of internal and peristomal hernias on 8/23/22   # Crohn's Disease s/p proctocolectomy with end ileostomy in 2009  Pt presented with abdominal pain and reduced ileostomy output. Leukocytosis to 20.8 thousand at admit. CT in the ER showed a closed loop obstruction and findings suspicious for internal hernia, possible parastomal hernia. Colorectal surgery consulted and pt taken for emergency laparotomy with lysis of adhesions and hernia repairs 8/23. IM was consulted for post operative medical management. WBC down to 12.4 today. Abdomen remains soft and incision site appears to be healing appropriately.     - diet being advanced to lower fiber diet today  - routine postop cares per surgery   - analgesics prn, simethicone prn     # Cough  Pt reported for several weeks PTA. CXR on 8/23 post operatively showed some atelectasis but not definite infiltrate/ COVID Negative. Lung are clear  - Pulmonary toilet with IS and acapella post-operatively   - supportive care  - Encourage ambulation as able  -8/26 patient reports occasional cough when throat tickles otherwise no cough or shortness of breath     # Hypomagnesemia & Hypermagnesemia- resolved  # Hyperchloremia - secondary to 0.9% saline- resolved  # Hypokalemia  # hypophosphatemia   K 3.3 on admission, improved with repletion.Mag 1.5 on admit, given repletion with Mag no elevated at 2.8, now has normalized  - replace K, phos, magnesium per protocol as needed     #  Hyperglycemia   Up to 204. A1C normal at 5.3%/  - BG <140, not needing insulin. Discontinue sliding scale insulin 8/25     # Hypothyroidism   - PTA Porcine thyroid 32.5 mg daily      # Risk for delirium  At risk  given age, recent anesthetic exposure, likely poor sleep  - maximize delirium preventive measures including preservation of usual sleep/wake cycle, provision of sensory aids, early mobility, bowel/bladder function, pain control, frequent reorientation, avoid deliriogenic meds, etc  - discontinued benadryl  - no signs of delirium at this time       Diet: Low Fiber Diet    DVT Prophylaxis: Heparin SQ  [place] Catheter: Not present  Central Lines: None  Cardiac Monitoring: None  Code Status: Full Code      Disposition Plan     Expected Discharge Date: 08/27/2022                The patient's care was discussed with the Bedside Nurse and Patient.    Cynthia Mancera MD  Hospitalist Service  [facility]  Securely message with the Vocera Web Console (learn more here)  Text page via [employer] Paging/Directory         Clinically Significant Risk Factors Present on Admission               # Overweight: Estimated body mass index is 27.34 kg/m  as calculated from the following:    Height as of this encounter: 1.524 m (5').    Weight as of this encounter: 63.5 kg (140 lb).        ______________________________________________________________________    Interval History   Had sharp pain earlier while walking. Took some oxy and tylenol. Seems improved and she is trying to walk more this afternoon.   Denies nausea or vomiting. Diet advanced to low fiber today and has small amount.   Denies sob, cough.     Data reviewed today: I reviewed all medications, new labs and imaging results over the last 24 hours. I personally reviewed no images or EKG's today.    Physical Exam   Vital Signs: Temp: 98.1  F (36.7  C) Temp src: Oral BP: (!) 149/79 Pulse: 78   Resp: 18 SpO2: 93 % O2 Device: None (Room air)    Weight: 140 lbs 0 oz  General Appearance: Alert, awake and no apparent distress  Respiratory: clear to ausculation bilaterally, no wheezing  Cardiovascular: regular rate and rhythm  GI: soft, ostomy bag with gas present  Skin: warm and dry      Data   Recent Labs   Lab 08/26/22  0624 08/26/22  0243 08/25/22  2033 08/25/22  1215 08/25/22  0809 08/25/22  0758 08/24/22  1115 08/24/22  0804 08/23/22  1104 08/23/22  0812 08/23/22  0500 08/22/22  2200 08/22/22 2000   WBC 8.0  --   --   --   --  9.7  --  12.4*  --  19.1*  --   --  20.8*   HGB 12.6  --   --   --   --  11.7  --  11.6*  --  13.1  --   --  13.8   MCV 86  --   --   --   --  86  --  89  --  85  --   --  87     --   --   --   --  200  --  208  --  241  --   --  243   INR  --   --   --   --   --   --   --   --   --   --   --  1.00  --      --   --   --   --  140  --  143  --  142  --   --  140   POTASSIUM 3.9  --  3.3*  --   --  3.2*  --  3.6   < > 3.8  --   --  3.3*   CHLORIDE 106  --   --   --   --  108  --  111*  --  112*  --   --  110*   CO2 29  --   --   --   --  28  --  28  --  24  --   --  23   BUN 13  --   --   --   --  16  --  19  --  22  --   --  22   CR 0.70  --   --   --   --  0.68  --  0.78  --  0.83  --   --  1.01   ANIONGAP 3  --   --   --   --  4  --  4  --  6  --   --  7   KATLYN 8.7  --   --   --   --  8.6  --  8.2*  --  8.3*  --   --  8.8   * 90  --  85   < > 118*   < > 112*   < > 153*   < >  --  204*   ALBUMIN  --   --   --   --   --   --   --   --   --  2.7*  --   --  3.4   PROTTOTAL  --   --   --   --   --   --   --   --   --  5.6*  --   --  7.0   BILITOTAL  --   --   --   --   --   --   --   --   --  0.7  --   --  0.6   ALKPHOS  --   --   --   --   --   --   --   --   --  68  --   --  92   ALT  --   --   --   --   --   --   --   --   --  23  --   --  28   AST  --   --   --   --   --   --   --   --   --  20  --   --  20   LIPASE  --   --   --   --   --   --   --   --   --   --   --   --  113    < > = values in this  interval not displayed.     No results found for this or any previous visit (from the past 24 hour(s)).  Medications       heparin ANTICOAGULANT  5,000 Units Subcutaneous Q8H     sodium chloride (PF)  3 mL Intracatheter Q8H     sodium chloride (PF)  3 mL Intracatheter Q8H

## 2022-08-26 NOTE — PLAN OF CARE
Goal Outcome Evaluation:      Pt reporting increasing abdominal pain and increased abdominal bloating worsening since yesterday, stated she is not improving. Pt refused ordered pain meds and requested call to provider. Suggested using heat packs with use of abdominal binder. Pt agreed. Called provider for additional recommendations with pain rating 9/10, provider recommendation to have pt take the prescribed oxy and tylenol.     Reeducated pt on benefits of pain relief as recommended, pt agreed to take prescribed oxy and tylenol.

## 2022-08-26 NOTE — PLAN OF CARE
Pt is A&OX4, VSS on RA. Pt is SBA. Pt has an old ileostomy that she does empty on her own then lets the nurse know to see the output which she leaves in the bathroom. Pt has been complaining of gas discomfort. She ambulated the [address] multiple times with daughter then asked for PRN to manage gas. Pt is on LR @ 100 ml/hr. Will continue to monitor.

## 2022-08-26 NOTE — PROGRESS NOTES
COLON & RECTAL SURGERY  PROGRESS NOTE    August 26, 2022  Post-op Day #3    SUBJECTIVE:  Having gas pains. Tolerating fulls but doesn't like that all the options have sugar in them. Ambulating independently in hallways.     OBJECTIVE:  Temp:  [98.1  F (36.7  C)-98.2  F (36.8  C)] 98.1  F (36.7  C)  Pulse:  [74-78] 78  Resp:  [16-18] 18  BP: (136-149)/(78-79) 149/79  SpO2:  [93 %] 93 %    Intake/Output Summary (Last 24 hours) at 8/26/2022 0915  Last data filed at 8/26/2022 0400  Gross per 24 hour   Intake 1040 ml   Output 2175 ml   Net -1135 ml       GENERAL:  Awake, alert, no acute distress  HEAD: Normocephalic atraumatic  SCLERA: Anicteric  EXTREMITIES: Warm and well perfused  ABDOMEN:  Soft, appropriately tender, mildly distended. No guarding, rigidity, or peritoneal signs. Ileostomy pink and protruding with gas and stool in bag.   INCISION:  C/d/i, island dressing removed, small amount of serous drainage, new dressing placed.     LABS:  Lab Results   Component Value Date    WBC 8.0 08/26/2022    WBC 5.7 07/17/2016     Lab Results   Component Value Date    HGB 12.6 08/26/2022    HGB 13.5 07/17/2016     Lab Results   Component Value Date    HCT 37.3 08/26/2022    HCT 38.9 07/17/2016     Lab Results   Component Value Date     08/26/2022     07/17/2016     Last Basic Metabolic Panel:  Lab Results   Component Value Date     08/26/2022     07/17/2016      Lab Results   Component Value Date    POTASSIUM 3.9 08/26/2022    POTASSIUM 3.4 07/17/2016     Lab Results   Component Value Date    CHLORIDE 106 08/26/2022    CHLORIDE 106 07/17/2016     Lab Results   Component Value Date    KATLYN 8.7 08/26/2022    KATLYN 8.5 07/17/2016     Lab Results   Component Value Date    CO2 29 08/26/2022    CO2 26 07/17/2016     Lab Results   Component Value Date    BUN 13 08/26/2022    BUN 10 07/17/2016     Lab Results   Component Value Date    CR 0.70 08/26/2022    CR 0.78 07/17/2016     Lab Results   Component Value  Date     08/26/2022    GLC 90 08/26/2022    GLC 86 07/17/2016       ASSESSMENT/PLAN: 81 F with h/o Crohn's who presented with closed loop SBO, now s/p exploratory laparotomy, lysis of adhesions, repair of internal hernia and parastomal hernia. AVSS.      1. Low fiber  2. PRN pain meds  3. OOB, ambulate  4. Dressing changes to midline incision daily and PRN  5. SQH for ppx  6. Possible discharge tomorrow      Seen and examined with Dr. Flores.     For questions/paging, please contact the CRS office at [phone].    Edie Gross PA-C  Colorectal Physician Assistant    [facility]  [address]  [place] [id number]  T: [phone]  F: [phone]      [facility] Staff  I performed a history and physical examination of the patient and discussed their management with the physician assistant. I reviewed the physician assistants note and agree with the documented findings and plan of care.     Gosia Flores MD, FACS    [facility]  [address]  [place] [address]  T: [phone]  F: [phone]

## 2022-08-27 VITALS
WEIGHT: 140 LBS | BODY MASS INDEX: 27.48 KG/M2 | RESPIRATION RATE: 18 BRPM | TEMPERATURE: 98.2 F | HEIGHT: 60 IN | HEART RATE: 82 BPM | SYSTOLIC BLOOD PRESSURE: 143 MMHG | OXYGEN SATURATION: 92 % | DIASTOLIC BLOOD PRESSURE: 80 MMHG

## 2022-08-27 PROCEDURE — 250N000011 HC RX IP 250 OP 636: Performed by: COLON & RECTAL SURGERY

## 2022-08-27 PROCEDURE — 99239 HOSP IP/OBS DSCHRG MGMT >30: CPT | Performed by: HOSPITALIST

## 2022-08-27 PROCEDURE — 250N000013 HC RX MED GY IP 250 OP 250 PS 637: Performed by: INTERNAL MEDICINE

## 2022-08-27 RX ORDER — SIMETHICONE 80 MG
80 TABLET,CHEWABLE ORAL EVERY 6 HOURS PRN
Qty: 12 TABLET | Refills: 0 | Status: SHIPPED | OUTPATIENT
Start: 2022-08-27

## 2022-08-27 RX ORDER — ACETAMINOPHEN 325 MG/1
650 TABLET ORAL EVERY 6 HOURS PRN
COMMUNITY
Start: 2022-08-27

## 2022-08-27 RX ORDER — OXYCODONE HYDROCHLORIDE 5 MG/1
2.5 TABLET ORAL EVERY 4 HOURS PRN
Qty: 12 TABLET | Refills: 0 | Status: SHIPPED | OUTPATIENT
Start: 2022-08-27

## 2022-08-27 RX ADMIN — OXYCODONE HYDROCHLORIDE 2.5 MG: 5 TABLET ORAL at 00:15

## 2022-08-27 RX ADMIN — ACETAMINOPHEN 650 MG: 325 TABLET ORAL at 00:19

## 2022-08-27 RX ADMIN — HEPARIN SODIUM 5000 UNITS: 5000 INJECTION, SOLUTION INTRAVENOUS; SUBCUTANEOUS at 07:36

## 2022-08-27 RX ADMIN — ACETAMINOPHEN 650 MG: 325 TABLET ORAL at 07:36

## 2022-08-27 RX ADMIN — OXYCODONE HYDROCHLORIDE 2.5 MG: 5 TABLET ORAL at 07:37

## 2022-08-27 RX ADMIN — HEPARIN SODIUM 5000 UNITS: 5000 INJECTION, SOLUTION INTRAVENOUS; SUBCUTANEOUS at 00:15

## 2022-08-27 ASSESSMENT — ACTIVITIES OF DAILY LIVING (ADL)
ADLS_ACUITY_SCORE: 23

## 2022-08-27 NOTE — PROGRESS NOTES
COLON & RECTAL SURGERY  PROGRESS NOTE    August 27, 2022  Post-op Day #4    SUBJECTIVE:  Doing well. + gas pains but better. Ready to go home    OBJECTIVE:  Temp:  [97.8  F (36.6  C)-98.6  F (37  C)] 98.2  F (36.8  C)  Pulse:  [80-82] 82  Resp:  [18] 18  BP: (133-143)/(68-85) 143/80  SpO2:  [92 %-94 %] 92 %      Intake/Output Summary (Last 24 hours) at 8/27/2022 1000  Last data filed at 8/26/2022 2233  Gross per 24 hour   Intake --   Output 1375 ml   Net -1375 ml       GENERAL:  Awake, alert, no acute distress  HEAD: Normocephalic atraumatic  SCLERA: Anicteric  EXTREMITIES: Warm and well perfused  ABDOMEN:  Soft, appropriately tender, mildly distended. No guarding, rigidity, or peritoneal signs. Ileostomy pink and protruding with gas and stool in bag.   INCISION:  C/d/i, island dressing removed, small amount of serous drainage, new dressing placed.     LABS:  No new labs    ASSESSMENT/PLAN: 81 F with h/o Crohn's who presented with closed loop SBO, now s/p exploratory laparotomy, lysis of adhesions, repair of internal hernia and parastomal hernia. AVSS.     - Ok to discharge from CRS standpoint  - f/u in my office for staple removal and post-op check- already scheduled    Gosia Flores MD, FACS    [facility]  [address]  [place] [address]  T: [phone]  F: [phone]

## 2022-08-27 NOTE — PLAN OF CARE
[id number]. A&Ox4, VSS on RA. Abd Pain is controlled with PRN oxy and PRN tylenol. Patient denies nausea. Up IND in the room and hallway with walker.Voiding in the BR. Patient is IND with ileostomy cares. Midline abd incision dressing changed 8/26, CDI. tolerating low fiber diet. Continue to monitor.

## 2022-08-27 NOTE — PLAN OF CARE
Goal Outcome Evaluation:    Pt A&OX4. VSS on RA. Up walking independently. Ileostomy with loose stools, and passing flatus. Oxycodone and tylenol given for pain. Midline incision dressing in place. Can leave it open to air after shower, pad drive and put on a band-aid for comfort or some leakage. Discharge instruction and medication went over with pt. Pt discharge to home.

## 2022-08-27 NOTE — DISCHARGE SUMMARY
Discharge Summary  Hospitalist    Date of Admission:  8/22/2022  Date of Discharge:  8/27/2022  Discharging Provider: Carline Le MD, MD  Date of Service (when I saw the patient): 08/27/22    Discharge Diagnoses     # Closed loop small bowel obstruction   # S/p ex lap, GAVIN, repair of internal and peristomal hernias on 8/23/22   # Crohn's Disease s/p proctocolectomy with end ileostomy in 2009    History of Present Illness   Please refer H & P for details.      Hospital Course   Merlene River is a 81 year old female admitted on 8/22/2022 after presenting to the emergency department for evaluation of abdominal pain, nausea and vomiting, and was diagnosed with a closed-loop small bowel obstruction as well as a possible secondary obstruction at parastomal hernia.      # Closed loop small bowel obstruction   # S/p ex lap, GAVIN, repair of internal and peristomal hernias on 8/23/22   # Crohn's Disease s/p proctocolectomy with end ileostomy in 2009  Pt presented with abdominal pain and reduced ileostomy output. Leukocytosis to 20.8 thousand at admit. CT in the ER showed a closed loop obstruction and findings suspicious for internal hernia, possible parastomal hernia. Colorectal surgery consulted and pt taken for emergency laparotomy with lysis of adhesions and hernia repairs 8/23. IM was consulted for post operative medical management. WBC improved postoperatively.  Abdomen remains soft and incision site appears to be healing appropriately.      - diet  advanced to lower fiber diet and patient tolerated this well.  Did have some gas pains for which as needed simethicone has been ordered.  - routine postop cares per surgery   - analgesics prn  -Patient discharged home in stable condition on 8/27.  Follow-up with CRS as recommended/arranged by them.     # Cough  Pt reported for several weeks PTA. CXR on 8/23 post operatively showed some atelectasis but not definite infiltrate/ COVID Negative. Lung are clear  - Pulmonary  toilet with IS and acapella post-operatively   - supportive care  - Encourage ambulation as able  -8/26 patient reports occasional cough when throat tickles otherwise no cough or shortness of breath     # Hypomagnesemia & Hypermagnesemia- resolved  # Hyperchloremia - secondary to 0.9% saline- resolved  # Hypokalemia  # hypophosphatemia   K 3.3 on admission, improved with repletion.Mag 1.5 on admit, given repletion with Mag no elevated at 2.8, now has normalized  - replace K, phos, magnesium per protocol as needed     # Hyperglycemia   Up to 204. A1C normal at 5.3%/  - BG <140, not needing insulin. Discontinue sliding scale insulin 8/25     # Hypothyroidism   - PTA thyroid supplement resumed at discharge      # Risk for delirium  At risk  given age, recent anesthetic exposure, likely poor sleep  - maximize delirium preventive measures including preservation of usual sleep/wake cycle, provision of sensory aids, early mobility, bowel/bladder function, pain control, frequent reorientation, avoid deliriogenic meds, etc  - discontinued benadryl  - no signs of delirium at this time          Carline Le MD, MD      Pending Results   These results will be followed up by Hospitalist team.  Unresulted Labs Ordered in the Past 30 Days of this Admission     No orders found from 7/23/2022 to 8/23/2022.          Code Status   Full Code       Primary Care Physician   Maria De Jesus Ceballos    Follow-ups Needed After Discharge   Follow-up Appointments     Follow-up and recommended labs and tests       Follow up in CRS clinic with Edie Gross PA-C on Tuesday 9/6/22 at 11:00am   for staple removal.   Please arrive 30 minutes prior to your appointment to complete new patient   paperwork.     Address: [facility] [place] [address] [address]  Phone: [phone]         Follow-up and recommended labs and tests       Follow up with primary care provider, Maria De Jesus [facility],   within 7 days for hospital follow-  up.  No follow up labs or test are   needed.             Physical Exam   Temp: 98.2  F (36.8  C) Temp src: Oral BP: (!) 143/80 Pulse: 82   Resp: 18 SpO2: 92 % O2 Device: None (Room air)    Vitals:    08/22/22 1956   Weight: 63.5 kg (140 lb)     Vital Signs with Ranges  Temp:  [97.8  F (36.6  C)-98.2  F (36.8  C)] 98.2  F (36.8  C)  Pulse:  [80-82] 82  Resp:  [18] 18  BP: (133-143)/(68-85) 143/80  SpO2:  [92 %-94 %] 92 %  I/O last 3 completed shifts:  In: -   Out: 1375 [Urine:550; Stool:825]    General Appearance:  Alert, awake and no apparent distress  Respiratory:  Nonlabored breathing  Cardiovascular:  RRR  GI: soft, ostomy bag with stool and gas noted  Skin: warm and dry      Discharge Disposition   Discharged to home  Condition at discharge: Stable    Consultations This Hospital Stay   COLORECTAL SURGERY IP CONSULT    Time Spent on this Encounter   I, Carline Le MD, personally saw the patient today and spent greater than 30 minutes discharging this patient.    Discharge Orders      Reason for your hospital stay    You were hospitalized for surgery.     Follow-up and recommended labs and tests     Follow up in CRS clinic with Edie Gross PA-C on Tuesday 9/6/22 at 11:00am for staple removal.   Please arrive 30 minutes prior to your appointment to complete new patient paperwork.     Address: [facility] [place] [address] [address]  Phone: [phone]     Activity    No heavy lifting >10-15lbs for 6 weeks.     Do not drive while taking narcotic pain medication.    Get regular activity and try to walk for a total of 30 minutes. Start slow by walking 5-10 minutes at a time, increasing each day to 30 minutes at one time. Slowly return to your regular level of activity. Rest as needed.    Do not soak the wounds in the bathtub until cleared by your surgeon.     Follow-up and recommended labs and tests     Follow up with primary care provider, [facility], within 7 days for hospital follow-  up.  No follow up labs or test are needed.     When to contact your care team    Call your primary doctor if you have any of the following: worsening abdominal pain, fever, constipation, diarrhea,  nausea, vomiting     Diet    Continue a low residue diet for 1-2 weeks.  Avoid nuts, seeds, popcorn, raw fruits and veggies with peels.     Discharge Medications   Current Discharge Medication List      START taking these medications    Details   acetaminophen (TYLENOL) 325 MG tablet Take 2 tablets (650 mg) by mouth every 6 hours as needed for mild pain or other (and adjunct with moderate or severe pain or per patient request)    Associated Diagnoses: SBO (small bowel obstruction) (H)      oxyCODONE (ROXICODONE) 5 MG tablet Take 0.5 tablets (2.5 mg) by mouth every 4 hours as needed for moderate to severe pain  Qty: 12 tablet, Refills: 0    Associated Diagnoses: SBO (small bowel obstruction) (H)      simethicone (MYLICON) 80 MG chewable tablet Take 1 tablet (80 mg) by mouth every 6 hours as needed for cramping or flatulence  Qty: 12 tablet, Refills: 0    Associated Diagnoses: SBO (small bowel obstruction) (H)         CONTINUE these medications which have NOT CHANGED    Details   CALCIUM & MAGNESIUM CARBONATES PO Take 1 tablet by mouth 2 times daily       Cholecalciferol (VITAMIN D3 PO) Take 5,000 Units by mouth daily      COENZYME Q-10 PO Take 100 mg by mouth daily       Docosahexaenoic Acid (DHA PO) Take 500 mg by mouth daily       LIOTHYRONINE SODIUM PO Take 15 mcg by mouth daily Compounded product      multivitamin, therapeutic (THERA-VIT) TABS Take 1 tablet by mouth daily      NALTREXONE HCL PO Take 3 mg by mouth At Bedtime Compounded product      NONFORMULARY Apply 0.5 mLs topically 2 times daily Twice daily, Monday through Friday only. Progesterone/Bi-est 50/50 DHEA 15mg/0.1 mg. Compounded product      VITAMIN K PO Take 140 mcg by mouth daily          STOP taking these medications       THYROID PO Comments:   Reason  for Stopping:             Allergies   Allergies   Allergen Reactions     Banana      Constipation     Contrast Dye      Diatrizoate      Contrast Dye   Other reaction(s): Confusion     Garlic Cramps     Gluten Meal      Reactive arthritis      Onion Cramps     Data   Most Recent 3 CBC's:  Recent Labs   Lab Test 08/26/22  0624 08/25/22  0758 08/24/22  0804   WBC 8.0 9.7 12.4*   HGB 12.6 11.7 11.6*   MCV 86 86 89    200 208      Most Recent 3 BMP's:  Recent Labs   Lab Test 08/26/22  0624 08/26/22  0243 08/25/22  2033 08/25/22  1215 08/25/22  0809 08/25/22  0758 08/24/22  1115 08/24/22  0804     --   --   --   --  140  --  143   POTASSIUM 3.9  --  3.3*  --   --  3.2*  --  3.6   CHLORIDE 106  --   --   --   --  108  --  111*   CO2 29  --   --   --   --  28  --  28   BUN 13  --   --   --   --  16  --  19   CR 0.70  --   --   --   --  0.68  --  0.78   ANIONGAP 3  --   --   --   --  4  --  4   KATLYN 8.7  --   --   --   --  8.6  --  8.2*   * 90  --  85   < > 118*   < > 112*    < > = values in this interval not displayed.     Most Recent 2 LFT's:  Recent Labs   Lab Test 08/23/22  0812 08/22/22 2000   AST 20 20   ALT 23 28   ALKPHOS 68 92   BILITOTAL 0.7 0.6     Most Recent INR's and Anticoagulation Dosing History:  Anticoagulation Dose History     Recent Dosing and Labs Latest Ref Rng & Units 9/22/2015 8/22/2022    INR 0.85 - 1.15 0.95 1.00        Most Recent 3 Troponin's:No lab results found.  Most Recent Cholesterol Panel:No lab results found.  Most Recent 6 Bacteria Isolates From Any Culture (See EPIC Reports for Culture Details):  Recent Labs   Lab Test 09/22/15  2355   CULT 10,000 to 50,000 colonies/mL mixed urogenital ciaran  Susceptibility testing not routinely done       Most Recent TSH, T4 and A1c Labs:  Recent Labs   Lab Test 08/23/22  0812   A1C 5.3       Results for orders placed or performed during the hospital encounter of 08/22/22   CT Abdomen Pelvis w/o Contrast     Value    Radiologist  flags Closed-loop small bowel obstruction (AA)    Narrative    EXAM: CT ABDOMEN PELVIS W/O CONTRAST  LOCATION: [facility]  DATE/TIME: 8/22/2022 8:29 PM    INDICATION: Abdominal pain. History of small bowel obstruction.  COMPARISON: MR enterography 08/27/2019  TECHNIQUE: CT scan of the abdomen and pelvis was performed without IV contrast. Multiplanar reformats were obtained. Dose reduction techniques were used.  CONTRAST: None.    FINDINGS:   LOWER CHEST: Bibasilar subsegmental atelectasis. No focal airspace disease.    HEPATOBILIARY: Normal.    PANCREAS: Normal.    SPLEEN: Normal.    ADRENAL GLANDS: Mild bilateral adrenal gland thickening, nonspecific. No discrete nodules.    KIDNEYS/BLADDER: Normal.    BOWEL: Status post total colectomy with right lower quadrant end ileostomy. Dilated mid small bowel loops, measuring up to 4.0 cm in diameter. There is mesenteric swirling with 2 closely apposed transition points, suspicious for closed loop obstruction.   Additionally, there is a parastomal hernia, with a dilated loop of small bowel measuring 3.2 cm in diameter. There is mesenteric edema in the right lower quadrant and within the parastomal hernia. No pneumatosis.    PERITONEUM/RETROPERITONEUM: Trace intraperitoneal free fluid. No focal fluid collection. No intraperitoneal free air.    LYMPH NODES: No lymphadenopathy.    VASCULATURE: Mild atherosclerotic calcifications.    PELVIC ORGANS: Unremarkable.    MUSCULOSKELETAL: Multilevel compression fractures of the thoracic and lumbar spine, which appear chronic.      Impression    IMPRESSION:   1.  Closed-loop small bowel obstruction with associated mesenteric swirling and edema, suspicious for internal hernia. Additional parastomal hernia containing a dilated small bowel loop, which may be a secondary site of obstruction. No pneumatosis or   intraperitoneal free air.      [Critical Result: Closed-loop small bowel obstruction]    Finding was  identified on 8/22/2022 8:30 PM.     Dr. Giraldo was contacted by me on 8/22/2022 8:50 PM and verbalized understanding of the critical result.      XR Chest 2 Views    Narrative    XR CHEST 2 VIEWS 8/23/2022 8:55 AM    HISTORY: rhonchi, N/V    COMPARISON: 5/8/2009      Impression    IMPRESSION: NG tube coursing below the left hemidiaphragm, tip outside  the field of view. Stable linear scarring in the right midlung.  Increased perihilar linear opacities and linear opacities in the left  midlung, likely due to atelectasis. No focal infiltrate, pleural  effusion, significant pulmonary edema or pneumothorax. Normal heart  size. Vertebroplasty changes in the midthoracic spine. Multiple wedge  compression deformities of lower thoracic and upper lumbar vertebral  bodies.    CHRISTIANO MEADOWS MD         SYSTEM ID:  [id number]

## 2022-08-29 NOTE — OP NOTE
Procedure Date: 08/23/2022    PREOPERATIVE DIAGNOSIS: Closed loop obstruction due to internal hernia and parastomal hernia.    POSTOPERATIVE DIAGNOSIS:   Closed loop obstruction due to internal hernia and parastomal hernia.    PROCEDURES PERFORMED:  1.  Exploratory laparotomy.  2.  Extensive lysis of adhesions greater than 60 minutes.  3.  Repair and reduce internal hernia and parastomal hernia.    SURGEON:  Gosia Flores MD    ASSISTANT:  Luz Mendez MD (Colorectal Surgery Fellow).    ANESTHESIA:  General.    INDICATIONS FOR PROCEDURE:  Merlene River is an 81-year-old female with a history of Crohn's colitis, status post total proctocolectomy with end ileostomy in 2006, multiple small bowel obstructions and known parastomal hernia, who presented with abdominal pain, nausea and vomiting.  She had a CT scan, which is suspicious for closed loop obstruction due to an internal hernia as well as involving a possible parastomal hernia as well.  She also had elevated white count of 20,000 on admission.  Given her CT scan findings,  her prior history and concern for possible bowel ischemia, I recommended emergent operative exploration with possible bowel resection, possible stoma revision and possible hernia repair.  We discussed the risks of the procedure.  She agreed to proceed.    OPERATIVE FINDINGS:  There were dense intra-abdominal adhesions between the abdominal wall as well as interloop adhesions.  A total of greater than 60 minutes was spent in lysing adhesions.  There was an internal hernia present involving the parastomal hernia.   This was reduced.  There is no evidence of bowel ischemia or necrosis.  There is no evidence of bowel perforation.  The parastomal hernia was repaired primarily with several #1 Vicryl sutures.      PROCEDURE IN DETAIL:   After informed consent was obtained, the patient was brought to the operating room and placed in the supine position on the operating room table.  Sequential  compression devices were placed on the lower extremities prior to induction, and general anesthesia was induced without difficulty.  A Mayfield catheter was inserted.  Her abdomen was sterilely prepped and draped in the usual fashion.    We made a large midline incision utilizing the patient's prior laparotomy scar with a 10 blade scalpel.  This was carried down through the subcutaneous tissue to the fascia.  After we safely entered the fascia there were significant adhesions of the small bowel to the anterior abdominal wall.  We spent the next 45 minutes lysing adhesions in order to be able to fully enter the abdomen.  There were a couple of seromyotomies that remained that were primarily repaired with 3-0 Vicryl Lembert sutures.  Once we were able to get into the abdomen, we noted that there was some dilated small bowel loops.  There is no obvious ischemia or necrosis, but appeared to be able to eviscerate the small bowel and noted that there was a closed loop obstruction as well as small bowel and a parastomal hernia.  Once we were able to eviscerate the small bowel and take down the parastomal hernia, we were able to reconfigure the bowel into a normal configuration.  We then spent additional time lysing additional small bowel adhesions in order to run the bowel from the ileostomy to the ligament of Treitz.  We repaired any additional seromyotomies with 3-0 Vicryl Lembert sutures.  We did not have to perform a resection of the small bowel.  We then evaluated our parastomal hernia.  Again, we reduced the small bowel from the parastomal hernia.  I then placed several #1 Vicryl sutures in order to try to primarily repair the parastomal hernia.  We then irrigated out the abdomen with several liters of warm sterile saline.  We ran the small bowel again one more time from the ligament of Treitz all the way down to the ileostomy, ensuring that there was no evidence of enterotomy.  The bowel was all viable.    We then  proceeded with closure of the abdomen.  The midline incision was closed with a looped 0 PDS from either end.  The subcutaneous tissue was irrigated with saline and the skin was closed loosely with staples.    The patient tolerated the procedure well without complications.      ESTIMATED BLOOD LOSS: 30 mL.    I was present and scrubbed for the entire duration of the operation.  The patient was extubated in the operating room and brought to recovery room in stable condition.    Paola Flores MD        D: 2022   T: 2022   MT: ALDO    Name:     SHERYL MAYBERRYEben  MRN:      [id number]        Account:        [id number]   :      1940           Procedure Date: 2022     Document: [id number]

## 2022-08-30 ENCOUNTER — PATIENT OUTREACH (OUTPATIENT)
Dept: CARE COORDINATION | Facility: CLINIC | Age: 82
End: 2022-08-30

## 2022-08-30 NOTE — PROGRESS NOTES
"Clinic Care Coordination Contact  [facility]: Post-Discharge Note  SITUATION                                                      Admission:    Admission Date: 08/22/22   Reason for Admission: Abdominal pain, Nausea and Vomiting  Discharge:   Discharge Date: 08/27/22  Discharge Diagnosis: Closed loop small bowel obstruction, S/p ex lap, GAVIN, repair of internal and peristomal hernias on 8/23/22, Crohn's Disease s/p proctocolectomy with end ileostomy in 2009    BACKGROUND                                                      Per hospital discharge summary and inpatient provider notes:    Merlene River is a 81 year old female with history of parastomal hernias, crohn's disease, multiple SBOs who presents with abdominal pain and low ostomy output. EMS reports the patient has been having minimal ostomy output for 10 days and has an extensive history of GI issues, causing her to come into the hospital about once a year due to annual bowel obstructions. Patient's daughter reports she had a parastomal hernia that was previously causing the blockage, but had a surgery to repair that hernia last year. She had been fine since her procedure, but presents with the same symptoms as previous obstructions. Endorses significant abdominal pain and vomiting. Mentions decreased appetite the past few days as well. Was given 4 of Zofran, 100 of Fentanyl and 1 of Dilaudid (at 1915) en route to the ED.    ASSESSMENT      Discharge Assessment  How are you doing now that you are home?: \"I'm doing okay thank you. I'm feeling fine, moving a little slower but yeah I'm okay. I was staying with my daughter but I'm back at home now and she's coming to check on me later this afternoon so yeah I'm okay.\"  How are your symptoms? (Red Flag symptoms escalate to triage hotline per guidelines): Improved  Do you feel your condition is stable enough to be safe at home until your provider visit?: Yes  Does the patient have their discharge " instructions? : Yes  Does the patient have questions regarding their discharge instructions? : Yes (see comment) (Patient had questions regarding wound care. [facility] reviewed AVS with patient.)  Were you started on any new medications or were there changes to any of your previous medications? : Yes  Does the patient have all of their medications?: Yes  Do you have questions regarding any of your medications? : No  Do you have all of your needed medical supplies or equipment (DME)?  (i.e. oxygen tank, CPAP, cane, etc.): Yes  Discharge follow-up appointment scheduled within 14 calendar days? : Yes  Discharge Follow Up Appointment Date: 09/01/22  Discharge Follow Up Appointment Scheduled with?: Primary Care Provider    Post-op ([facility]A Only)  If the patient had a surgery or procedure, do they have any questions for a nurse?: No      PLAN                                                      Outpatient Plan:      Follow-ups Needed After Discharge     Follow-up Appointments     Follow-up and recommended labs and tests       Follow up in [facility] clinic with Edie Gross PA-C on Tuesday 9/6/22 at 11:00am   for staple removal.   Please arrive 30 minutes prior to your appointment to complete new patient   paperwork.      Address: [facility] [place] [address]  Phone: [phone]         Follow-up and recommended labs and tests       Follow up with primary care provider, [facility],   within 7 days for hospital follow- up.  No follow up labs or test are   needed.     No future appointments.      For any urgent concerns, please contact our 24 hour nurse triage line: [phone] ([phone])         FATOUMATA Chao  [phone]  [facility]

## (undated) DEVICE — Device

## (undated) DEVICE — LINEN TOWEL PACK X5 5464

## (undated) DEVICE — DRAPE LAP W/ARMBOARD 29410

## (undated) DEVICE — STPL SKIN 35W 6.9MM  PXW35

## (undated) DEVICE — SUCTION CANISTER MEDIVAC LINER 3000ML W/LID 65651-530

## (undated) DEVICE — DRAPE IOBAN INCISE 23X17" 6650EZ

## (undated) DEVICE — SU VICRYL 3-0 SH CR 8X18" J774

## (undated) DEVICE — SU PDS II 0 CTX 60" Z990G

## (undated) DEVICE — SPONGE LAP 18X18" X8435

## (undated) DEVICE — GLOVE PROTEXIS W/NEU-THERA 7.0  2D73TE70

## (undated) DEVICE — PREP CHLORAPREP 26ML TINTED HI-LITE ORANGE 930815

## (undated) DEVICE — PACK MAJOR SBA15MAFSI

## (undated) DEVICE — SUCTION TIP YANKAUER STR K87

## (undated) DEVICE — SOL WATER IRRIG 1000ML BOTTLE 2F7114

## (undated) DEVICE — GLOVE PROTEXIS BLUE W/NEU-THERA 6.5  2D73EB65

## (undated) DEVICE — NDL 22GA 1.5"

## (undated) DEVICE — GOWN LG DISP 9515

## (undated) DEVICE — SOL NACL 0.9% IRRIG 1000ML BOTTLE 2F7124

## (undated) DEVICE — SU VICRYL 3-0 SH 27" J316H

## (undated) DEVICE — ESU ELEC BLADE 6" COATED E1450-6

## (undated) DEVICE — SU VICRYL 2-0 SH 27" J317H

## (undated) DEVICE — ESU GROUND PAD UNIVERSAL W/O CORD

## (undated) DEVICE — SU VICRYL 1 CT-1 27" J341H

## (undated) DEVICE — SUCTION TIP POOLE K770

## (undated) DEVICE — GLOVE PROTEXIS MICRO 6.5  2D73PM65

## (undated) DEVICE — SU VICRYL 0 TIE 12X18" J906G

## (undated) RX ORDER — DEXAMETHASONE SODIUM PHOSPHATE 4 MG/ML
INJECTION, SOLUTION INTRA-ARTICULAR; INTRALESIONAL; INTRAMUSCULAR; INTRAVENOUS; SOFT TISSUE
Status: DISPENSED
Start: 2022-08-23

## (undated) RX ORDER — PROPOFOL 10 MG/ML
INJECTION, EMULSION INTRAVENOUS
Status: DISPENSED
Start: 2022-08-22

## (undated) RX ORDER — PROPOFOL 10 MG/ML
INJECTION, EMULSION INTRAVENOUS
Status: DISPENSED
Start: 2022-08-23

## (undated) RX ORDER — HYDROMORPHONE HYDROCHLORIDE 1 MG/ML
INJECTION, SOLUTION INTRAMUSCULAR; INTRAVENOUS; SUBCUTANEOUS
Status: DISPENSED
Start: 2022-08-23

## (undated) RX ORDER — CEFAZOLIN SODIUM/WATER 2 G/20 ML
SYRINGE (ML) INTRAVENOUS
Status: DISPENSED
Start: 2022-08-22

## (undated) RX ORDER — FENTANYL CITRATE 50 UG/ML
INJECTION, SOLUTION INTRAMUSCULAR; INTRAVENOUS
Status: DISPENSED
Start: 2022-08-23

## (undated) RX ORDER — ONDANSETRON 2 MG/ML
INJECTION INTRAMUSCULAR; INTRAVENOUS
Status: DISPENSED
Start: 2022-08-23

## (undated) RX ORDER — FENTANYL CITRATE 50 UG/ML
INJECTION, SOLUTION INTRAMUSCULAR; INTRAVENOUS
Status: DISPENSED
Start: 2022-08-22

## (undated) RX ORDER — METRONIDAZOLE 500 MG/100ML
INJECTION, SOLUTION INTRAVENOUS
Status: DISPENSED
Start: 2022-08-22